# Patient Record
Sex: MALE | Race: WHITE | NOT HISPANIC OR LATINO | Employment: FULL TIME | URBAN - METROPOLITAN AREA
[De-identification: names, ages, dates, MRNs, and addresses within clinical notes are randomized per-mention and may not be internally consistent; named-entity substitution may affect disease eponyms.]

---

## 2013-05-09 LAB — HCV AB SER-ACNC: NEGATIVE

## 2018-06-28 LAB
LEFT EYE DIABETIC RETINOPATHY: NORMAL
RIGHT EYE DIABETIC RETINOPATHY: NORMAL

## 2018-06-28 PROCEDURE — 3072F LOW RISK FOR RETINOPATHY: CPT | Performed by: INTERNAL MEDICINE

## 2018-06-29 PROBLEM — R25.2 MUSCLE CRAMP: Status: ACTIVE | Noted: 2017-12-07

## 2018-06-29 PROBLEM — R79.89 ABNORMAL LIVER FUNCTION TESTS: Status: ACTIVE | Noted: 2018-06-29

## 2018-07-03 ENCOUNTER — LAB (OUTPATIENT)
Dept: LAB | Facility: CLINIC | Age: 38
End: 2018-07-03
Payer: COMMERCIAL

## 2018-07-03 ENCOUNTER — OFFICE VISIT (OUTPATIENT)
Dept: FAMILY MEDICINE CLINIC | Facility: CLINIC | Age: 38
End: 2018-07-03
Payer: COMMERCIAL

## 2018-07-03 ENCOUNTER — TRANSCRIBE ORDERS (OUTPATIENT)
Dept: ADMINISTRATIVE | Facility: HOSPITAL | Age: 38
End: 2018-07-03

## 2018-07-03 ENCOUNTER — TELEPHONE (OUTPATIENT)
Dept: FAMILY MEDICINE CLINIC | Facility: CLINIC | Age: 38
End: 2018-07-03

## 2018-07-03 VITALS
HEART RATE: 77 BPM | OXYGEN SATURATION: 98 % | HEIGHT: 72 IN | BODY MASS INDEX: 34.27 KG/M2 | SYSTOLIC BLOOD PRESSURE: 124 MMHG | DIASTOLIC BLOOD PRESSURE: 74 MMHG | WEIGHT: 253 LBS

## 2018-07-03 DIAGNOSIS — E10.9 TYPE 1 DIABETES MELLITUS WITHOUT COMPLICATION (HCC): ICD-10-CM

## 2018-07-03 DIAGNOSIS — E10.9 TYPE 1 DIABETES MELLITUS WITHOUT COMPLICATION (HCC): Primary | ICD-10-CM

## 2018-07-03 DIAGNOSIS — R79.89 ABNORMAL LIVER FUNCTION TESTS: ICD-10-CM

## 2018-07-03 LAB
ALBUMIN SERPL BCP-MCNC: 4.4 G/DL (ref 3.5–5)
ALP SERPL-CCNC: 49 U/L (ref 46–116)
ALT SERPL W P-5'-P-CCNC: 60 U/L (ref 12–78)
ANION GAP SERPL CALCULATED.3IONS-SCNC: 5 MMOL/L (ref 4–13)
AST SERPL W P-5'-P-CCNC: 56 U/L (ref 5–45)
BILIRUB DIRECT SERPL-MCNC: 0.11 MG/DL (ref 0–0.2)
BILIRUB SERPL-MCNC: 0.61 MG/DL (ref 0.2–1)
BUN SERPL-MCNC: 21 MG/DL (ref 5–25)
CALCIUM SERPL-MCNC: 9.5 MG/DL (ref 8.3–10.1)
CHLORIDE SERPL-SCNC: 106 MMOL/L (ref 100–108)
CO2 SERPL-SCNC: 28 MMOL/L (ref 21–32)
CREAT SERPL-MCNC: 1.32 MG/DL (ref 0.6–1.3)
EST. AVERAGE GLUCOSE BLD GHB EST-MCNC: 148 MG/DL
GFR SERPL CREATININE-BSD FRML MDRD: 69 ML/MIN/1.73SQ M
GLUCOSE SERPL-MCNC: 62 MG/DL (ref 65–140)
HBA1C MFR BLD: 6.8 % (ref 4.2–6.3)
POTASSIUM SERPL-SCNC: 3.9 MMOL/L (ref 3.5–5.3)
PROT SERPL-MCNC: 7.4 G/DL (ref 6.4–8.2)
SODIUM SERPL-SCNC: 139 MMOL/L (ref 136–145)

## 2018-07-03 PROCEDURE — 1036F TOBACCO NON-USER: CPT | Performed by: INTERNAL MEDICINE

## 2018-07-03 PROCEDURE — 80076 HEPATIC FUNCTION PANEL: CPT

## 2018-07-03 PROCEDURE — 80048 BASIC METABOLIC PNL TOTAL CA: CPT

## 2018-07-03 PROCEDURE — 3008F BODY MASS INDEX DOCD: CPT | Performed by: INTERNAL MEDICINE

## 2018-07-03 PROCEDURE — 99203 OFFICE O/P NEW LOW 30 MIN: CPT | Performed by: INTERNAL MEDICINE

## 2018-07-03 PROCEDURE — 83036 HEMOGLOBIN GLYCOSYLATED A1C: CPT

## 2018-07-03 PROCEDURE — 36415 COLL VENOUS BLD VENIPUNCTURE: CPT

## 2018-07-03 NOTE — TELEPHONE ENCOUNTER
Spoke to Caden Engel at [facility]  [employer]  Test strips do not require an auth       Ref #[id number]

## 2018-07-03 NOTE — PROGRESS NOTES
Assessment/Plan:    Type 1 diabetes mellitus without complication (HCC)   Seems to have good control historically though fasting sugars are higher than the rest of the day  We will check Z4Y and metabolic panel today  We did discuss managing his insulin pump when he goes to [facility] and I feel the best approach is for him to reset his pump time to the local time when he lands and then to eat on [facility] mealtimes  Abnormal liver function tests    Check liver function tests  We had previously had him evaluated by GI who felt that his elevated transaminases were related to his large muscle mass  Pure hypercholesterolemia    Will hold off on checking lipid panel today because he is not fasting but check in the future  Muscle cramp    Resolved  Chief complaint: Here to establish care    HPI:    Kavita Garcias is a 30 y o  male with type 1 diabetes mellitus here to establish care  Fasting blood sugars are running between 50- 265 an average of approximately 180  Sugars are better before lunch with most readings in the 100 speed  Before dinner has a few readings in the 2-300 range but also most in the 100s  He had been having muscle cramps in the right pectoral muscle about 6 months ago which has mostly resolved  He was also experiencing insomnia which seems to have resolved after he switched jobs  He now works for an [employer]; only needs to travel to [facility] 3 or 4 times a year       Past Medical History:   Diagnosis Date    Diabetes mellitus ([facility] [address] ) 10/10/2005    without mention of complication, not stated as uncontrolled    Hypercholesterolemia 10/10/2005    Hypokalemia 07/05/2006        Past Surgical History:   Procedure Laterality Date    VARICOCELE EXCISION Left 1995        Family History   Problem Relation Age of Onset    Seizures Sister     Thyroid disease Mother         Social History     Social History    Marital status: /Civil Union     Spouse name: N/A    Number of children: N/A    Years of education: N/A     Occupational History    Not on file  Social History Main Topics    Smoking status: Never Smoker    Smokeless tobacco: Never Used    Alcohol use Yes      Comment: occasionally    Drug use: No    Sexual activity: Yes     Partners: Female     Birth control/ protection: None     Other Topics Concern    Not on file     Social History Narrative    No narrative on file        Current Outpatient Prescriptions   Medication Sig Dispense Refill    glucagon (GLUCAGEN DIAGNOSTIC) 1 mg injection Inject 1 mg into a muscle as needed        HUMALOG 100 UNIT/ML injection as needed MN-0530 1 90 UNITS/-0800 2 40 UNITS/-1200 1 45 UNITS/HR 1200-500PM 1 55 UNITS/-1100 1 50 UNITS/HR 1100-MN 1 55 UNITS/HR       insulin regular subcutaneous syringe 1 unit/mL (finesse) by Other route      glucose blood (CONTOUR NEXT TEST) test strip USE TO TEST BLOOD SUGAR 7-8 TIMES DAILY 100 each 0    Multiple Vitamins-Minerals (MULTIVITAMIN ADULTS PO) Take 1 tablet by mouth daily         No current facility-administered medications for this visit  No Known Allergies    Review of Systems   Constitutional: Negative for fever and unexpected weight change  Eyes: Negative for visual disturbance  Just had his diabetic eye exam    Respiratory: Negative for chest tightness and shortness of breath  Cardiovascular:        Per HPI   Gastrointestinal: Negative for abdominal pain, blood in stool, constipation and diarrhea  Endocrine: Negative for polydipsia and polyuria  Genitourinary: Negative for dysuria and hematuria  Musculoskeletal:        Per HPI       /74 (BP Location: Left arm, Patient Position: Sitting, Cuff Size: Large)   Pulse 77   Ht 5' 11 5" (1 816 m)   Wt 115 kg (253 lb)   SpO2 98%   BMI 34 79 kg/m²     General:  Well-developed, well-nourished, in no acute distress  Skin:  Warm, moist,   Tattoos on back and arm    HENT:  Normocephalic, atraumatic, tympanic membranes clear bilaterally, ear canals unremarkable bilaterally, nasal mucosa without lesions, oropharynx was clear without exudate  Eyes: PERRL, EOMI, conjunctivae normal   Neck:  No thyromegaly, thyroid nodules or cervical lymphadenopathy  Cardiac:  Regular rate and rhythm, no murmur, gallop, or rub  There is no JVD or HJR  Lungs:  Clear to auscultation and percussion  Abdomen:  Soft, nontender, normoactive bowel sounds, no palpable masses, no hepatosplenomegaly  Musculoskeletal:  No clubbing, cyanosis, or edema  He is very muscular  Neurologic:  Cranial nerves 2-12 intact, motor was 5/5 in all major groups, DTRs 2+ throughout  Psychiatric:  Mood bright, appropriate affect and insight

## 2018-07-03 NOTE — ASSESSMENT & PLAN NOTE
Check liver function tests  We had previously had him evaluated by GI who felt that his elevated transaminases were related to his large muscle mass

## 2018-07-03 NOTE — ASSESSMENT & PLAN NOTE
Seems to have good control historically though fasting sugars are higher than the rest of the day  We will check C1P and metabolic panel today  We did discuss managing his insulin pump when he goes to [facility] and I feel the best approach is for him to reset his pump time to the local time when he lands and [facility]

## 2018-07-05 NOTE — PROGRESS NOTES
AST, creatinine are chronically elevated due increased muscle mass  A1c is at goal   Continue present regimen

## 2018-11-19 DIAGNOSIS — E10.9 TYPE 1 DIABETES MELLITUS WITHOUT COMPLICATION (HCC): Primary | ICD-10-CM

## 2018-12-28 LAB
CREAT ?TM UR-SCNC: 196.2 UMOL/L
HBA1C MFR BLD HPLC: 6.8 %
MICROALBUMIN UR-MCNC: 0.7 MG/L (ref 0–20)
MICROALBUMIN/CREAT UR: NORMAL MG/G{CREAT}

## 2018-12-31 ENCOUNTER — OFFICE VISIT (OUTPATIENT)
Dept: FAMILY MEDICINE CLINIC | Facility: CLINIC | Age: 38
End: 2018-12-31
Payer: COMMERCIAL

## 2018-12-31 VITALS
WEIGHT: 252.4 LBS | HEIGHT: 71 IN | SYSTOLIC BLOOD PRESSURE: 122 MMHG | HEART RATE: 73 BPM | OXYGEN SATURATION: 94 % | DIASTOLIC BLOOD PRESSURE: 76 MMHG | BODY MASS INDEX: 35.34 KG/M2

## 2018-12-31 DIAGNOSIS — E10.9 TYPE 1 DIABETES MELLITUS WITHOUT COMPLICATION (HCC): Primary | ICD-10-CM

## 2018-12-31 PROCEDURE — 99214 OFFICE O/P EST MOD 30 MIN: CPT | Performed by: INTERNAL MEDICINE

## 2018-12-31 PROCEDURE — 3008F BODY MASS INDEX DOCD: CPT | Performed by: INTERNAL MEDICINE

## 2018-12-31 RX ORDER — SYRINGE-NEEDLE,INSULIN,0.5 ML 27GX1/2"
SYRINGE, EMPTY DISPOSABLE MISCELLANEOUS 3 TIMES DAILY PRN
Qty: 100 EACH | Refills: 0 | Status: SHIPPED | OUTPATIENT
Start: 2018-12-31

## 2018-12-31 NOTE — ASSESSMENT & PLAN NOTE
Lab Results   Component Value Date    HGBA1C 6 8 12/28/2018    Although there still is some lability to blood sugars, overall control is excellent  Perhaps with if we get a new insulin pump we may be able to graph data directly to the iPhone to get a better sense of patterns  For now, continue the current settings    Get fasting blood work a few days before return visit in 4 months

## 2018-12-31 NOTE — PROGRESS NOTES
Assessment/Plan:    Problem List Items Addressed This Visit        Endocrine    Type 1 diabetes mellitus without complication ([facility] [address] ) - Primary     Lab Results   Component Value Date    HGBA1C 6 8 12/28/2018    Although there still is some lability to blood sugars, overall control is excellent  Perhaps with if we get a new insulin pump we may be able to graph data directly to the iPhone to get a better sense of patterns  For now, continue the current settings  Get fasting blood work a few days before return visit in 4 months             Relevant Medications    HUMALOG 100 UNIT/ML injection    Insulin Syringe 27G X 1/2" 0 5 ML MISC    Other Relevant Orders    Basic metabolic panel    Hemoglobin A1C    Lipid panel         BMI Counseling: Body mass index is 35 2 kg/m²  Discussed the patient's BMI with him  The BMI is in the acceptable range  He is a  and appears to be at about 15% body fat  Subjective:     Patient ID: Violette Briceño is a 41 y o  male  Here for follow up of Type 1 diabetes mellitus without complications  He forgot to bring his blood sugars which have actually been more labile  He had been getting high blood sugars in the afternoon which he attributed to caffeine  He finds that when he puts the insulin pump setup on the buttock, he tends to get more labile readings  He has a very strict regimen that he follows with strict protein/carb/fat ratios for bodybuilding  He tends to get more lows than highs  He sometimes gets reading in 200-300s sometimes and is very aggressive with bolusing insulin  He lifts 7 days a week and is able to find a gym when he travels  Objective:    /76 (BP Location: Left arm, Patient Position: Sitting, Cuff Size: Large)   Pulse 73   Ht 5' 11" (1 803 m)   Wt 114 kg (252 lb 6 4 oz)   SpO2 94%   BMI 35 20 kg/m²       Physical Exam    General:  Well-developed, well-nourished, in no acute distress    Cardiac:  Regular rate and rhythm, no murmur, gallop, or rub  There is no JVD or HJR  Lungs:  Clear to auscultation and percussion  Abdomen:  Soft, nontender, normoactive bowel sounds, no palpable masses, no hepatosplenomegaly  Extremities:  No clubbing, cyanosis, or edema  Patient's shoes and socks removed  Right Foot/Ankle   Right Foot Inspection  Skin Exam: skin normal, skin intact, callus ( right great toe) and callus ( right great toe) no dry skin, no warmth, no erythema, no maceration, no abnormal color, no pre-ulcer and no ulcer                            Sensory       Monofilament testing: intact  Vascular    The right DP pulse is 2+  The right PT pulse is 2+  Left Foot/Ankle  Left Foot Inspection  Skin Exam: skin normal and skin intactno dry skin, no warmth, no erythema, no maceration, normal color, no pre-ulcer, no ulcer and no callus                                         Sensory       Monofilament: intact  Vascular    The left DP pulse is 2+  The left PT pulse is 2+  Assign Risk Category:  No deformity present; No loss of protective sensation;  No weak pulses       Risk: 0

## 2019-02-25 ENCOUNTER — TELEPHONE (OUTPATIENT)
Dept: FAMILY MEDICINE CLINIC | Facility: CLINIC | Age: 39
End: 2019-02-25

## 2019-02-25 NOTE — TELEPHONE ENCOUNTER
I spoke to the pharmacist and clarified that he is indeed receiving the insulin as prescribed via his insulin pump

## 2019-02-25 NOTE — TELEPHONE ENCOUNTER
[facility] is now trying to fill his Humalog  They need clarification on his order  They are questioning the dosing

## 2019-04-11 ENCOUNTER — TELEPHONE (OUTPATIENT)
Dept: FAMILY MEDICINE CLINIC | Facility: CLINIC | Age: 39
End: 2019-04-11

## 2019-04-11 LAB — HBA1C MFR BLD HPLC: 6.3 %

## 2019-04-30 ENCOUNTER — OFFICE VISIT (OUTPATIENT)
Dept: FAMILY MEDICINE CLINIC | Facility: CLINIC | Age: 39
End: 2019-04-30
Payer: COMMERCIAL

## 2019-04-30 VITALS
SYSTOLIC BLOOD PRESSURE: 130 MMHG | DIASTOLIC BLOOD PRESSURE: 74 MMHG | BODY MASS INDEX: 34.1 KG/M2 | HEIGHT: 71 IN | WEIGHT: 243.6 LBS

## 2019-04-30 DIAGNOSIS — E10.9 TYPE 1 DIABETES MELLITUS WITHOUT COMPLICATION (HCC): ICD-10-CM

## 2019-04-30 DIAGNOSIS — E78.49 OTHER HYPERLIPIDEMIA: Primary | ICD-10-CM

## 2019-04-30 PROCEDURE — 1036F TOBACCO NON-USER: CPT | Performed by: INTERNAL MEDICINE

## 2019-04-30 PROCEDURE — 99214 OFFICE O/P EST MOD 30 MIN: CPT | Performed by: INTERNAL MEDICINE

## 2019-04-30 PROCEDURE — 3008F BODY MASS INDEX DOCD: CPT | Performed by: INTERNAL MEDICINE

## 2019-05-21 DIAGNOSIS — E10.9 TYPE 1 DIABETES MELLITUS WITHOUT COMPLICATION (HCC): ICD-10-CM

## 2019-05-24 DIAGNOSIS — E10.9 TYPE 1 DIABETES MELLITUS WITHOUT COMPLICATION (HCC): ICD-10-CM

## 2019-06-03 DIAGNOSIS — E10.9 TYPE 1 DIABETES MELLITUS WITHOUT COMPLICATION (HCC): ICD-10-CM

## 2019-08-27 LAB
LEFT EYE DIABETIC RETINOPATHY: NORMAL
RIGHT EYE DIABETIC RETINOPATHY: NORMAL

## 2019-10-30 LAB
CHOLEST SERPL-MCNC: 176 MG/DL (ref ?–200)
HDLC SERPL-MCNC: 48 MG/DL (ref 40–60)
LDLC SERPL CALC-MCNC: 116 MG/DL (ref 0–100)
TRIGL SERPL-MCNC: 59 MG/DL (ref ?–150)

## 2019-12-05 LAB — HBA1C MFR BLD HPLC: 6.6 %

## 2019-12-09 ENCOUNTER — OFFICE VISIT (OUTPATIENT)
Dept: FAMILY MEDICINE CLINIC | Facility: CLINIC | Age: 39
End: 2019-12-09
Payer: COMMERCIAL

## 2019-12-09 VITALS
OXYGEN SATURATION: 96 % | DIASTOLIC BLOOD PRESSURE: 76 MMHG | HEART RATE: 76 BPM | HEIGHT: 71 IN | WEIGHT: 236.99 LBS | BODY MASS INDEX: 33.18 KG/M2 | SYSTOLIC BLOOD PRESSURE: 120 MMHG

## 2019-12-09 DIAGNOSIS — E10.9 TYPE 1 DIABETES MELLITUS WITHOUT COMPLICATION (HCC): ICD-10-CM

## 2019-12-09 DIAGNOSIS — E78.49 OTHER HYPERLIPIDEMIA: ICD-10-CM

## 2019-12-09 DIAGNOSIS — Z23 NEEDS FLU SHOT: Primary | ICD-10-CM

## 2019-12-09 LAB
CREAT UR-MCNC: 54.3 MG/DL
MICROALBUMIN UR-MCNC: <5 MG/L (ref 0–20)
MICROALBUMIN/CREAT 24H UR: <9 MG/G CREATININE (ref 0–30)

## 2019-12-09 PROCEDURE — 90471 IMMUNIZATION ADMIN: CPT | Performed by: INTERNAL MEDICINE

## 2019-12-09 PROCEDURE — 3008F BODY MASS INDEX DOCD: CPT | Performed by: INTERNAL MEDICINE

## 2019-12-09 PROCEDURE — 82043 UR ALBUMIN QUANTITATIVE: CPT | Performed by: INTERNAL MEDICINE

## 2019-12-09 PROCEDURE — 90686 IIV4 VACC NO PRSV 0.5 ML IM: CPT | Performed by: INTERNAL MEDICINE

## 2019-12-09 PROCEDURE — 82570 ASSAY OF URINE CREATININE: CPT | Performed by: INTERNAL MEDICINE

## 2019-12-09 PROCEDURE — 99213 OFFICE O/P EST LOW 20 MIN: CPT | Performed by: INTERNAL MEDICINE

## 2019-12-09 NOTE — PATIENT INSTRUCTIONS
Please complete our patient survey and let us know about your experience today  Problem List Items Addressed This Visit        Endocrine    Type 1 diabetes mellitus without complication ([facility] [address] )       Lab Results   Component Value Date    HGBA1C 6 6 12/05/2019   Excellent blood sugars on current regimen  Continue current pump settings  Will check urine for microalbumin  Other    Other hyperlipidemia     Acceptable LDL  Continue current diet             Other Visit Diagnoses     Needs flu shot    -  Primary    Type 2 diabetes mellitus without complication, without long-term current use of insulin ([facility] [address] )

## 2019-12-09 NOTE — PROGRESS NOTES
Assessment/Plan:    Problem List Items Addressed This Visit        Endocrine    Type 1 diabetes mellitus without complication ([facility] [address] )       Lab Results   Component Value Date    HGBA1C 6 6 12/05/2019   Excellent blood sugars on current regimen  Continue current pump settings  Will check urine for microalbumin  Relevant Orders    Microalbumin / creatinine urine ratio       Other    Other hyperlipidemia     Acceptable LDL  Continue current diet  Other Visit Diagnoses     Needs flu shot    -  Primary    Relevant Orders    influenza vaccine, 8873-0906, quadrivalent, 0 5 mL, preservative-free, for adult and pediatric patients 6 mos+ (Jacqueline Madrigal, FLULAVAL, FLUZONE)          Chief Complaint     Follow-up          Patient ID: Kourtney Carlin is a 41 y o  male here for routine follow up  He is checking his blood sugars 8-10 per day via finger sticks  There isn't a definite pattern to his blood sugars  He is still weight lifting 7 days a week  His insulin pump settings are: 1 45, 530-0800 2 1, 7911-9683 1 35, 1200-500 1 55, 5-1200 1 5  He is having a low blood sugar every other day which is typical for him  He doesn't have hypoglycemia unawareness  No tingling in feet  Objective:    /76 (BP Location: Left arm, Patient Position: Sitting, Cuff Size: Large)   Pulse 76   Ht 5' 11" (1 803 m)   Wt 107 kg (236 lb 15 9 oz)   SpO2 96%   BMI 33 05 kg/m²     Wt Readings from Last 3 Encounters:   12/09/19 107 kg (236 lb 15 9 oz)   04/30/19 110 kg (243 lb 9 6 oz)   12/31/18 114 kg (252 lb 6 4 oz)         Physical Exam    General:  Well-developed, well-nourished, in no acute distress  Cardiac:  Regular rate and rhythm, no murmur, gallop, or rub  There is no JVD or HJR  Lungs:  Clear to auscultation and percussion  Abdomen:  Soft, nontender, normoactive bowel sounds, no palpable masses, no hepatosplenomegaly  Extremities:  No clubbing, cyanosis, or edema    Diabetic Foot Exam    Patient's shoes and socks removed  Right Foot/Ankle   Right Foot Inspection  Skin Exam: skin normal and skin intact no dry skin, no warmth, no callus, no erythema, no maceration, no abnormal color, no pre-ulcer, no ulcer and no callus                            Sensory       Monofilament testing: intact  Vascular    The right DP pulse is 2+  The right PT pulse is 2+  Left Foot/Ankle  Left Foot Inspection  Skin Exam: skin normal and skin intactno dry skin, no warmth, no erythema, no maceration, normal color, no pre-ulcer, no ulcer and no callus                                         Sensory       Monofilament: intact  Vascular    The left DP pulse is 2+  The left PT pulse is 2+  Assign Risk Category:  No deformity present; No loss of protective sensation;  No weak pulses       Risk: 0

## 2019-12-09 NOTE — ASSESSMENT & PLAN NOTE
Lab Results   Component Value Date    HGBA1C 6 6 12/05/2019   Excellent blood sugars on current regimen  Continue current pump settings  Will check urine for microalbumin

## 2020-02-10 ENCOUNTER — TELEPHONE (OUTPATIENT)
Dept: FAMILY MEDICINE CLINIC | Facility: CLINIC | Age: 40
End: 2020-02-10

## 2020-02-10 NOTE — TELEPHONE ENCOUNTER
Crow's mother called to report that he was acutely ill  He has a fever of 102 8 and had severe rigors  He has had abdominal pain which now has settled in the suprapubic region  He has had no change in his bowel habits, vomiting, dysuria, or hematuria  He has not been taking any new supplements  His blood sugars were sustain in the low 200s earlier despite extra boluses on his insulin pump  His most recent blood sugar was 103  I convinced him to go to urgent care to get chemistries, a CBC, and urinalysis

## 2020-02-11 ENCOUNTER — PATIENT MESSAGE (OUTPATIENT)
Dept: FAMILY MEDICINE CLINIC | Facility: CLINIC | Age: 40
End: 2020-02-11

## 2020-02-28 DIAGNOSIS — E10.9 TYPE 1 DIABETES MELLITUS WITHOUT COMPLICATION (HCC): ICD-10-CM

## 2020-03-05 ENCOUNTER — TELEPHONE (OUTPATIENT)
Dept: FAMILY MEDICINE CLINIC | Facility: CLINIC | Age: 40
End: 2020-03-05

## 2020-03-05 NOTE — TELEPHONE ENCOUNTER
Pharmacy left a message  They need to know the units per day patient will be using with his pump  I can give them a verbal or you can submit a new RX

## 2020-03-05 NOTE — TELEPHONE ENCOUNTER
Leonor Peraza from [employer] called and wanted to know what is the daily amount that Josue should be putting the Humalog into his pump on a daily bases   They stated that this is the only way that the insurance company will cover his insulin

## 2020-03-06 NOTE — TELEPHONE ENCOUNTER
We were waiting for a call back from Tawnya Bull so he could tell us how much insulin he uses daily  Please call him again

## 2020-05-29 DIAGNOSIS — E10.9 TYPE 1 DIABETES MELLITUS WITHOUT COMPLICATION (HCC): ICD-10-CM

## 2020-06-09 ENCOUNTER — TELEPHONE (OUTPATIENT)
Dept: FAMILY MEDICINE CLINIC | Facility: CLINIC | Age: 40
End: 2020-06-09

## 2020-07-20 LAB — HBA1C MFR BLD HPLC: 6.3 %

## 2020-08-13 ENCOUNTER — OFFICE VISIT (OUTPATIENT)
Dept: FAMILY MEDICINE CLINIC | Facility: CLINIC | Age: 40
End: 2020-08-13
Payer: COMMERCIAL

## 2020-08-13 VITALS
OXYGEN SATURATION: 97 % | BODY MASS INDEX: 31.89 KG/M2 | DIASTOLIC BLOOD PRESSURE: 72 MMHG | TEMPERATURE: 98.2 F | HEART RATE: 65 BPM | HEIGHT: 71 IN | WEIGHT: 227.8 LBS | SYSTOLIC BLOOD PRESSURE: 116 MMHG

## 2020-08-13 DIAGNOSIS — E78.49 OTHER HYPERLIPIDEMIA: ICD-10-CM

## 2020-08-13 DIAGNOSIS — E10.9 TYPE 1 DIABETES MELLITUS WITHOUT COMPLICATION (HCC): Primary | ICD-10-CM

## 2020-08-13 PROCEDURE — 3044F HG A1C LEVEL LT 7.0%: CPT | Performed by: INTERNAL MEDICINE

## 2020-08-13 PROCEDURE — 1036F TOBACCO NON-USER: CPT | Performed by: INTERNAL MEDICINE

## 2020-08-13 PROCEDURE — 3725F SCREEN DEPRESSION PERFORMED: CPT | Performed by: INTERNAL MEDICINE

## 2020-08-13 PROCEDURE — 3008F BODY MASS INDEX DOCD: CPT | Performed by: INTERNAL MEDICINE

## 2020-08-13 PROCEDURE — 99213 OFFICE O/P EST LOW 20 MIN: CPT | Performed by: INTERNAL MEDICINE

## 2020-08-13 NOTE — PROGRESS NOTES
Assessment/Plan:    Problem List Items Addressed This Visit        Endocrine    Type 1 diabetes mellitus without complication ([address] ) - Primary       Lab Results   Component Value Date    HGBA1C 6 3 07/20/2020   Excellent glycemic control with his current pump settings  He is managing his pump settings on his own and seems to be making this worked very well for him  Continue current strategy  Relevant Orders    Ambulatory referral to Ophthalmology    Basic metabolic panel    Lipid panel    Hemoglobin A1C       Other    Other hyperlipidemia     His LDL is under 90 on diet alone  Will hold off on starting statins  BMI Counseling: Body mass index is 31 77 kg/m²  Follow-up plan was not completed due to patient refusing BMI follow-up plan  Patient is not obese  He is a  and has about triple normal muscle mass  Chief Complaint     Physical Exam          Patient ID: Jerrell Reina is a 42 y o  male who returns for routine follow up  His morning blood sugars mostly in the 100s as are the remainder of his blood sugars  He may have hypoglycemia 50-65 2-3 times per week  He may test up to 10 times a day  He has been adjusting his basal settings based on increased insulin sensitivity because he lost weight by cutting back his calories  No further abdominal pain after his appendectomy in February  Objective:    /72 (BP Location: Left arm, Patient Position: Sitting, Cuff Size: Large)   Pulse 65   Temp 98 2 °F (36 8 °C)   Ht 5' 11" (1 803 m)   Wt 103 kg (227 lb 12 8 oz)   SpO2 97%   BMI 31 77 kg/m²     Wt Readings from Last 3 Encounters:   08/13/20 103 kg (227 lb 12 8 oz)   12/09/19 107 kg (236 lb 15 9 oz)   04/30/19 110 kg (243 lb 9 6 oz)         Physical Exam    General:  Well-developed, well-nourished, in no acute distress  Cardiac:  Regular rate and rhythm, no murmur, gallop, or rub  There is no JVD or HJR  Lungs:  Clear to auscultation and percussion    Abdomen: Soft, nontender, normoactive bowel sounds, no palpable masses, no hepatosplenomegaly  Extremities:  No clubbing, cyanosis, or edema

## 2020-08-13 NOTE — ASSESSMENT & PLAN NOTE
Lab Results   Component Value Date    HGBA1C 6 3 07/20/2020   Excellent glycemic control with his current pump settings  He is managing his pump settings on his own and seems to be making this worked very well for him  Continue current strategy

## 2020-08-28 LAB
LEFT EYE DIABETIC RETINOPATHY: NORMAL
RIGHT EYE DIABETIC RETINOPATHY: NORMAL

## 2020-09-20 DIAGNOSIS — E10.9 TYPE 1 DIABETES MELLITUS WITHOUT COMPLICATION (HCC): ICD-10-CM

## 2020-09-21 ENCOUNTER — TELEPHONE (OUTPATIENT)
Dept: FAMILY MEDICINE CLINIC | Facility: CLINIC | Age: 40
End: 2020-09-21

## 2020-09-21 DIAGNOSIS — E10.9 TYPE 1 DIABETES MELLITUS WITHOUT COMPLICATION (HCC): ICD-10-CM

## 2020-09-21 RX ORDER — INSULIN LISPRO 100 [IU]/ML
INJECTION, SOLUTION INTRAVENOUS; SUBCUTANEOUS
Qty: 70 ML | Refills: 3 | Status: SHIPPED | OUTPATIENT
Start: 2020-09-21 | End: 2020-09-21 | Stop reason: CLARIF

## 2020-09-21 NOTE — TELEPHONE ENCOUNTER
[facility] pharmacy called stating they received refill request for insulin but it came through as a cartridge  I    The order needs to be sent through as a vial and the order also needs to have the Max units per day listed  They are asking for a new RX to be sent

## 2020-09-26 DIAGNOSIS — E10.9 TYPE 1 DIABETES MELLITUS WITHOUT COMPLICATION (HCC): ICD-10-CM

## 2020-11-27 LAB — HBA1C MFR BLD HPLC: 6.4 %

## 2020-12-03 ENCOUNTER — OFFICE VISIT (OUTPATIENT)
Dept: FAMILY MEDICINE CLINIC | Facility: CLINIC | Age: 40
End: 2020-12-03
Payer: COMMERCIAL

## 2020-12-03 VITALS
HEIGHT: 71 IN | BODY MASS INDEX: 32.98 KG/M2 | SYSTOLIC BLOOD PRESSURE: 122 MMHG | WEIGHT: 235.6 LBS | TEMPERATURE: 98.4 F | DIASTOLIC BLOOD PRESSURE: 72 MMHG | OXYGEN SATURATION: 96 % | HEART RATE: 65 BPM

## 2020-12-03 DIAGNOSIS — E78.49 OTHER HYPERLIPIDEMIA: ICD-10-CM

## 2020-12-03 DIAGNOSIS — Z00.00 ANNUAL PHYSICAL EXAM: Primary | ICD-10-CM

## 2020-12-03 DIAGNOSIS — E10.9 TYPE 1 DIABETES MELLITUS WITHOUT COMPLICATION (HCC): ICD-10-CM

## 2020-12-03 LAB
CREAT UR-MCNC: 57.3 MG/DL
MICROALBUMIN UR-MCNC: <5 MG/L (ref 0–20)
MICROALBUMIN/CREAT 24H UR: <9 MG/G CREATININE (ref 0–30)

## 2020-12-03 PROCEDURE — 3725F SCREEN DEPRESSION PERFORMED: CPT | Performed by: INTERNAL MEDICINE

## 2020-12-03 PROCEDURE — 82043 UR ALBUMIN QUANTITATIVE: CPT | Performed by: INTERNAL MEDICINE

## 2020-12-03 PROCEDURE — 1036F TOBACCO NON-USER: CPT | Performed by: INTERNAL MEDICINE

## 2020-12-03 PROCEDURE — 82570 ASSAY OF URINE CREATININE: CPT | Performed by: INTERNAL MEDICINE

## 2020-12-03 PROCEDURE — 3008F BODY MASS INDEX DOCD: CPT | Performed by: INTERNAL MEDICINE

## 2020-12-03 PROCEDURE — 99395 PREV VISIT EST AGE 18-39: CPT | Performed by: INTERNAL MEDICINE

## 2020-12-03 RX ORDER — MELOXICAM 15 MG/1
15 TABLET ORAL DAILY
COMMUNITY
Start: 2020-11-15 | End: 2021-03-30

## 2021-03-10 DIAGNOSIS — Z23 ENCOUNTER FOR IMMUNIZATION: ICD-10-CM

## 2021-03-17 ENCOUNTER — IMMUNIZATIONS (OUTPATIENT)
Dept: FAMILY MEDICINE CLINIC | Facility: HOSPITAL | Age: 41
End: 2021-03-17

## 2021-03-17 DIAGNOSIS — Z23 ENCOUNTER FOR IMMUNIZATION: Primary | ICD-10-CM

## 2021-03-17 PROCEDURE — 0001A SARS-COV-2 / COVID-19 MRNA VACCINE (PFIZER-BIONTECH) 30 MCG: CPT

## 2021-03-17 PROCEDURE — 91300 SARS-COV-2 / COVID-19 MRNA VACCINE (PFIZER-BIONTECH) 30 MCG: CPT

## 2021-03-19 LAB — HBA1C MFR BLD HPLC: 6 %

## 2021-03-19 PROCEDURE — 3044F HG A1C LEVEL LT 7.0%: CPT | Performed by: INTERNAL MEDICINE

## 2021-03-30 ENCOUNTER — OFFICE VISIT (OUTPATIENT)
Dept: FAMILY MEDICINE CLINIC | Facility: CLINIC | Age: 41
End: 2021-03-30
Payer: COMMERCIAL

## 2021-03-30 VITALS
OXYGEN SATURATION: 98 % | WEIGHT: 229 LBS | SYSTOLIC BLOOD PRESSURE: 120 MMHG | HEIGHT: 71 IN | DIASTOLIC BLOOD PRESSURE: 68 MMHG | HEART RATE: 72 BPM | BODY MASS INDEX: 32.06 KG/M2 | TEMPERATURE: 97.8 F

## 2021-03-30 DIAGNOSIS — E78.49 OTHER HYPERLIPIDEMIA: ICD-10-CM

## 2021-03-30 DIAGNOSIS — Z11.4 SCREENING FOR HIV (HUMAN IMMUNODEFICIENCY VIRUS): ICD-10-CM

## 2021-03-30 DIAGNOSIS — E10.9 TYPE 1 DIABETES MELLITUS WITHOUT COMPLICATION (HCC): Primary | ICD-10-CM

## 2021-03-30 PROCEDURE — 1036F TOBACCO NON-USER: CPT | Performed by: INTERNAL MEDICINE

## 2021-03-30 PROCEDURE — 99214 OFFICE O/P EST MOD 30 MIN: CPT | Performed by: INTERNAL MEDICINE

## 2021-03-30 PROCEDURE — 3008F BODY MASS INDEX DOCD: CPT | Performed by: INTERNAL MEDICINE

## 2021-03-30 NOTE — ASSESSMENT & PLAN NOTE
Lab Results   Component Value Date    HGBA1C 6 0 03/19/2021   Somewhat challenging to manage his hypoglycemic episodes as there does not appear to be a clear pattern  That said, it does seem that some of them are related to his workouts and I suggested maybe he have a rapid acting carbohydrate before works out  He is also going to need to continue to adjust his insulin settings as he loses more weight

## 2021-03-30 NOTE — PROGRESS NOTES
Assessment/Plan:    Problem List Items Addressed This Visit        Endocrine    Type 1 diabetes mellitus without complication ([facility] [address] ) - Primary       Lab Results   Component Value Date    HGBA1C 6 0 03/19/2021   Somewhat challenging to manage his hypoglycemic episodes as there does not appear to be a clear pattern  That said, it does seem that some of them are related to his workouts and I suggested maybe he have a rapid acting carbohydrate before works out  He is also going to need to continue to adjust his insulin settings as he loses more weight  Relevant Orders    Hemoglobin A1C    Lipid panel    Basic metabolic panel      Other Visit Diagnoses     Screening for HIV (human immunodeficiency virus)        Relevant Orders    HIV 1/2 Antigen/Antibody (4th Generation) w Reflex [facility]    Encounter for immunization              Chief Complaint     Follow-up          Patient ID: Ghulam Aguilar is a 47 y o  male who returns for routine follow up  He has been limiting his carbs and has better glycemic control  He is having at least 1 hypoglycemic episode per day  There doesn't seem to be a pattern according to his glucose monitor  He does notice that after his workouts he may get a low even if he takes a snack prior to his workout  His weight is down 6 lbs  He had to change his insulin sensitivity seems to have increased so he has taken to reducing the carbs entered into his monitor      Pump settings:  6432-5775 1 3  530-0830 1 9  830-12 1 35  12-5 1 7  5:00 p m -12:00 a m  1 4    Objective:    /68 (BP Location: Right arm, Patient Position: Sitting, Cuff Size: Large)   Pulse 72   Temp 97 8 °F (36 6 °C)   Ht 5' 11" (1 803 m)   Wt 104 kg (229 lb)   SpO2 98%   BMI 31 94 kg/m²     Wt Readings from Last 3 Encounters:   03/30/21 104 kg (229 lb)   12/03/20 107 kg (235 lb 9 6 oz)   08/13/20 103 kg (227 lb 12 8 oz)         Physical Exam    General:  Well-developed, well-nourished, in no acute distress  Cardiac:  Regular rate and rhythm, no murmur, gallop, or rub  There is no JVD or HJR  Lungs:  Clear to auscultation and percussion  Abdomen:  Soft, nontender, normoactive bowel sounds, no palpable masses, no hepatosplenomegaly  Extremities:  No clubbing, cyanosis, or edema

## 2021-04-14 ENCOUNTER — IMMUNIZATIONS (OUTPATIENT)
Dept: FAMILY MEDICINE CLINIC | Facility: HOSPITAL | Age: 41
End: 2021-04-14

## 2021-04-14 DIAGNOSIS — Z23 ENCOUNTER FOR IMMUNIZATION: Primary | ICD-10-CM

## 2021-04-14 PROCEDURE — 91300 SARS-COV-2 / COVID-19 MRNA VACCINE (PFIZER-BIONTECH) 30 MCG: CPT

## 2021-04-14 PROCEDURE — 0002A SARS-COV-2 / COVID-19 MRNA VACCINE (PFIZER-BIONTECH) 30 MCG: CPT

## 2021-05-26 ENCOUNTER — TELEPHONE (OUTPATIENT)
Dept: FAMILY MEDICINE CLINIC | Facility: CLINIC | Age: 41
End: 2021-05-26

## 2021-05-26 NOTE — TELEPHONE ENCOUNTER
Patients wife called and stated that patient got a US done at [facility]  They told them that the US was normal and nothing was wrong  Patients wife would like to know if you could look over the results and see if you get anything from it  Patient is still in a lot of pain       Results are in [place]

## 2021-05-26 NOTE — TELEPHONE ENCOUNTER
I did review the results but did we order the [address]? It doesn't look like we did - can we find out who ordered it? GI etc?  Thanks

## 2021-05-26 NOTE — TELEPHONE ENCOUNTER
The US does show thickening of the gall bladder wall, with some lesions, and some sludge in his gall bladder  This may cause pain, but not generally severe pain  If it is severe he may need to go to the ED    I can also try and order a CT scan of the abdomen as his US was abnormal

## 2021-05-26 NOTE — TELEPHONE ENCOUNTER
Spoke to patients wife, she stated that she is going to take patient to the ED as he is in severe pain   [place] so she stated she was going to take him to [facility]

## 2021-05-26 NOTE — TELEPHONE ENCOUNTER
[facility]  They ordered the US and sent him [facility] to get it done, Patient states was told nothing was wrong on the [address] but called and is having severe stomach pain  Patient's wife wanted a second opinion with the US

## 2021-05-30 DIAGNOSIS — E10.9 TYPE 1 DIABETES MELLITUS WITHOUT COMPLICATION (HCC): ICD-10-CM

## 2021-06-01 ENCOUNTER — TELEPHONE (OUTPATIENT)
Dept: FAMILY MEDICINE CLINIC | Facility: CLINIC | Age: 41
End: 2021-06-01

## 2021-06-01 RX ORDER — BLOOD SUGAR DIAGNOSTIC
STRIP MISCELLANEOUS
Qty: 900 STRIP | Refills: 3 | Status: SHIPPED | OUTPATIENT
Start: 2021-06-01 | End: 2022-05-31 | Stop reason: SDUPTHER

## 2021-06-01 NOTE — TELEPHONE ENCOUNTER
I spoke to  Josue this afternoon  He was admitted to 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

## 2021-06-01 NOTE — TELEPHONE ENCOUNTER
Patient had emergency surgery 5/26 at [facility] and wanted to touch base with you on his blood sugars  In the mornings they are over 200 and they stay there all day until the evening  He has been limiting his carb intake and stated that even taking his insulin it is not making much of a difference  They drop to 115-140 by the end of the day  Would like to know what he can do about this      Please advise

## 2021-06-02 ENCOUNTER — TELEPHONE (OUTPATIENT)
Dept: FAMILY MEDICINE CLINIC | Facility: CLINIC | Age: 41
End: 2021-06-02

## 2021-06-03 NOTE — TELEPHONE ENCOUNTER
I sent him a [employer] message with instructions 
Patient called with his blood sugars for today  Last evening at 6:28pm= 157  6/1 @ 7:36pm= 175  6/1 @ 11:25pm= 172  6/2 @ 6:22am= 153  6/2 @ 8:47am= 188  6/2 @ 10:01am= 188  6/2 @ 11:33am= 260  6/2 @ 12:24pm= 269  6/2 @ 2:32pm= 222  6/2 @ 3:30pm= 216  6/2 @ 4:30pm= 149         States he eats breakfast around 8am and normally gives himself 33 carbohydrates in his pump, today he gave his pump 50 carbohydrates and sugars still increased  Please advise 
I, Wiliam Olivo, performed the initial face to face bedside interview with this patient regarding history of present illness, review of symptoms and relevant past medical, social and family history.  I completed an independent physical examination.  I was the initial provider who evaluated this patient. I have signed out the follow up of any pending tests (i.e. labs, radiological studies) to the ACP.  I have communicated the patient’s plan of care and disposition with the ACP.

## 2021-06-07 ENCOUNTER — PATIENT MESSAGE (OUTPATIENT)
Dept: FAMILY MEDICINE CLINIC | Facility: CLINIC | Age: 41
End: 2021-06-07

## 2021-06-07 DIAGNOSIS — D64.9 ANEMIA FOLLOWING SURGERY: Primary | ICD-10-CM

## 2021-06-21 NOTE — TELEPHONE ENCOUNTER
I spoke with Charlene Yepez  He is scheduled to see Dr Nely Mauro tomorrow to discuss blood in stool (mixed in, not on the surface) and persistent drenching night sweats (changing bedding 3 times a night)  I suggested to Charlene Yepez that he likely needs a follow up CT abdomen to rule out abscess and a colonoscopy to ascertain source of bleeding  I gave him my cell so he can let me know what the evaluation plan is

## 2021-07-02 LAB — EXTERNAL HIV SCREEN: NORMAL

## 2021-07-06 ENCOUNTER — OFFICE VISIT (OUTPATIENT)
Dept: FAMILY MEDICINE CLINIC | Facility: CLINIC | Age: 41
End: 2021-07-06
Payer: COMMERCIAL

## 2021-07-06 VITALS
HEART RATE: 84 BPM | WEIGHT: 210 LBS | BODY MASS INDEX: 29.4 KG/M2 | DIASTOLIC BLOOD PRESSURE: 64 MMHG | TEMPERATURE: 98 F | OXYGEN SATURATION: 98 % | HEIGHT: 71 IN | SYSTOLIC BLOOD PRESSURE: 122 MMHG

## 2021-07-06 DIAGNOSIS — S30.1XXA ABDOMINAL HEMATOMA: ICD-10-CM

## 2021-07-06 DIAGNOSIS — D64.9 POSTOPERATIVE ANEMIA: ICD-10-CM

## 2021-07-06 DIAGNOSIS — E10.9 TYPE 1 DIABETES MELLITUS WITHOUT COMPLICATION (HCC): Primary | ICD-10-CM

## 2021-07-06 DIAGNOSIS — R35.1 NOCTURIA: ICD-10-CM

## 2021-07-06 PROBLEM — L80 VITILIGO: Status: ACTIVE | Noted: 2021-07-06

## 2021-07-06 PROCEDURE — 1036F TOBACCO NON-USER: CPT | Performed by: INTERNAL MEDICINE

## 2021-07-06 PROCEDURE — 99214 OFFICE O/P EST MOD 30 MIN: CPT | Performed by: INTERNAL MEDICINE

## 2021-07-06 PROCEDURE — 3725F SCREEN DEPRESSION PERFORMED: CPT | Performed by: INTERNAL MEDICINE

## 2021-07-06 PROCEDURE — 3008F BODY MASS INDEX DOCD: CPT | Performed by: INTERNAL MEDICINE

## 2021-07-06 NOTE — ASSESSMENT & PLAN NOTE
Lab Results   Component Value Date    HGBA1C 6 0 03/19/2021   Diabetes control is excellent   Continue pump settings and modification

## 2021-07-06 NOTE — PATIENT INSTRUCTIONS
Abdominal hematoma  Will just monitor symptoms for now  Hold off on repeating CT abdomen as it wouldn't  for now  Type 1 diabetes mellitus without complication [facility])    Lab Results   Component Value Date    HGBA1C 6 0 03/19/2021   Diabetes control is excellent   Continue pump settings and modification

## 2021-07-06 NOTE — ASSESSMENT & PLAN NOTE
Skin changes are consistent with vitiligo which is certainly something seen with type 1 diabetes  Recommend observation and sunscreen

## 2021-07-06 NOTE — PROGRESS NOTES
Assessment/Plan:    Problem List Items Addressed This Visit        Endocrine    Type 1 diabetes mellitus without complication ([facility] [address] ) - Primary       Lab Results   Component Value Date    HGBA1C 6 0 03/19/2021   Diabetes control is excellent  Continue pump settings and modification         Relevant Orders    Basic metabolic panel    Hemoglobin A1C    Lipid panel       Other    Abdominal hematoma     Will just monitor symptoms for now  Hold off on repeating CT abdomen as it wouldn't  for now  Other Visit Diagnoses     Nocturia        Relevant Orders    Ambulatory referral to Urology    Postoperative anemia        Relevant Orders    CBC      BMI Counseling: Body mass index is 29 29 kg/m²  The BMI is above normal  Nutrition recommendations include moderation in carbohydrate intake  Exercise recommendations include strength training exercises  Chief Complaint     Follow-up; Care Gap Request          Patient ID: Morgan Hook is a 47 y o  male who returns for routine follow up  He had a colonoscopy 10 days ago which revealed bleeding from the anastomosis  He hasn't had any further rectal bleeding and he is having normal bowel movements  His night sweats stopped about a week ago  He reports that he has been getting up to urinate 5 times at night and finds it is more difficult to empty his bladder when he stands up as opposed to urinating when he is sitting  He does feel that he is emptying his bladder  His blood sugars have been well-controlled and he has been reducing his basal rates on his insulin pump  His weight dropped to 198 lbs but he has been supplementing his protein and walking 35 minutes per day  He knows that he has been getting light patches on his skin involving his cheeks and forehead  This was more evident when he got a tan      Objective:    /64 (BP Location: Right arm, Patient Position: Sitting, Cuff Size: Large)   Pulse 84   Temp 98 °F (36 7 °C)   Ht 5' 11" (1 803 m)   Wt 95 3 kg (210 lb)   SpO2 98%   BMI 29 29 kg/m²     Wt Readings from Last 3 Encounters:   07/06/21 95 3 kg (210 lb)   03/30/21 104 kg (229 lb)   12/03/20 107 kg (235 lb 9 6 oz)         Physical Exam    General:  Well-developed, well-nourished, in no acute distress  Cardiac:  Regular rate and rhythm, no murmur, gallop, or rub  There is no JVD or HJR  Lungs:  Clear to auscultation and percussion  Abdomen:  Soft, nontender, normoactive bowel sounds, no palpable masses, no hepatosplenomegaly  Well-healed laparotomy scar  Mild right lower quadrant tenderness but no palpable mass  Extremities:  No clubbing, cyanosis, or edema  Skin:  Hypopigmentation of the skin on the forehead and also on the malar region

## 2021-09-08 DIAGNOSIS — E10.9 TYPE 1 DIABETES MELLITUS WITHOUT COMPLICATION (HCC): ICD-10-CM

## 2021-09-14 ENCOUNTER — TELEPHONE (OUTPATIENT)
Dept: FAMILY MEDICINE CLINIC | Facility: CLINIC | Age: 41
End: 2021-09-14

## 2021-09-14 NOTE — TELEPHONE ENCOUNTER
Can you resend a prescription for patient's insulin   Pharmacy needs a description of usage and how much and how often he should be taking it

## 2021-09-15 ENCOUNTER — PATIENT MESSAGE (OUTPATIENT)
Dept: FAMILY MEDICINE CLINIC | Facility: CLINIC | Age: 41
End: 2021-09-15

## 2021-09-15 DIAGNOSIS — E10.9 TYPE 1 DIABETES MELLITUS WITHOUT COMPLICATION (HCC): ICD-10-CM

## 2021-10-18 DIAGNOSIS — E10.9 TYPE 1 DIABETES MELLITUS WITHOUT COMPLICATION (HCC): ICD-10-CM

## 2021-11-23 ENCOUNTER — PATIENT MESSAGE (OUTPATIENT)
Dept: FAMILY MEDICINE CLINIC | Facility: CLINIC | Age: 41
End: 2021-11-23

## 2021-11-23 ENCOUNTER — OFFICE VISIT (OUTPATIENT)
Dept: FAMILY MEDICINE CLINIC | Facility: CLINIC | Age: 41
End: 2021-11-23
Payer: COMMERCIAL

## 2021-11-23 VITALS
HEART RATE: 85 BPM | SYSTOLIC BLOOD PRESSURE: 122 MMHG | BODY MASS INDEX: 35 KG/M2 | DIASTOLIC BLOOD PRESSURE: 78 MMHG | WEIGHT: 250 LBS | HEIGHT: 71 IN | TEMPERATURE: 98.2 F | OXYGEN SATURATION: 98 %

## 2021-11-23 DIAGNOSIS — E10.9 TYPE 1 DIABETES MELLITUS WITHOUT COMPLICATION (HCC): Primary | ICD-10-CM

## 2021-11-23 DIAGNOSIS — E78.49 OTHER HYPERLIPIDEMIA: ICD-10-CM

## 2021-11-23 DIAGNOSIS — G56.03 BILATERAL CARPAL TUNNEL SYNDROME: ICD-10-CM

## 2021-11-23 DIAGNOSIS — G56.03 BILATERAL CARPAL TUNNEL SYNDROME: Primary | ICD-10-CM

## 2021-11-23 DIAGNOSIS — K42.9 UMBILICAL HERNIA WITHOUT OBSTRUCTION AND WITHOUT GANGRENE: ICD-10-CM

## 2021-11-23 PROCEDURE — 3008F BODY MASS INDEX DOCD: CPT | Performed by: INTERNAL MEDICINE

## 2021-11-23 PROCEDURE — 99214 OFFICE O/P EST MOD 30 MIN: CPT | Performed by: INTERNAL MEDICINE

## 2021-11-23 PROCEDURE — 1036F TOBACCO NON-USER: CPT | Performed by: INTERNAL MEDICINE

## 2021-12-17 LAB
CREAT ?TM UR-SCNC: 0.7 UMOL/L
MICROALBUMIN/CREAT UR: 165.5 MG/G{CREAT}

## 2021-12-17 PROCEDURE — 3060F POS MICROALBUMINURIA REV: CPT | Performed by: INTERNAL MEDICINE

## 2021-12-28 ENCOUNTER — TELEPHONE (OUTPATIENT)
Dept: ADMINISTRATIVE | Facility: OTHER | Age: 41
End: 2021-12-28

## 2022-02-22 ENCOUNTER — TELEPHONE (OUTPATIENT)
Dept: ADMINISTRATIVE | Facility: OTHER | Age: 42
End: 2022-02-22

## 2022-02-22 ENCOUNTER — OFFICE VISIT (OUTPATIENT)
Dept: FAMILY MEDICINE CLINIC | Facility: CLINIC | Age: 42
End: 2022-02-22
Payer: COMMERCIAL

## 2022-02-22 VITALS
DIASTOLIC BLOOD PRESSURE: 76 MMHG | BODY MASS INDEX: 34.72 KG/M2 | HEART RATE: 91 BPM | SYSTOLIC BLOOD PRESSURE: 122 MMHG | HEIGHT: 71 IN | TEMPERATURE: 98.2 F | OXYGEN SATURATION: 99 % | WEIGHT: 248 LBS

## 2022-02-22 DIAGNOSIS — K42.9 UMBILICAL HERNIA WITHOUT OBSTRUCTION AND WITHOUT GANGRENE: ICD-10-CM

## 2022-02-22 DIAGNOSIS — E78.49 OTHER HYPERLIPIDEMIA: ICD-10-CM

## 2022-02-22 DIAGNOSIS — G56.03 BILATERAL CARPAL TUNNEL SYNDROME: ICD-10-CM

## 2022-02-22 DIAGNOSIS — Z11.59 NEED FOR HEPATITIS C SCREENING TEST: ICD-10-CM

## 2022-02-22 DIAGNOSIS — Z11.4 SCREENING FOR HIV (HUMAN IMMUNODEFICIENCY VIRUS): ICD-10-CM

## 2022-02-22 DIAGNOSIS — E10.9 TYPE 1 DIABETES MELLITUS WITHOUT COMPLICATION (HCC): Primary | ICD-10-CM

## 2022-02-22 PROBLEM — S30.1XXA ABDOMINAL HEMATOMA: Status: RESOLVED | Noted: 2021-07-06 | Resolved: 2022-02-22

## 2022-02-22 PROCEDURE — 3008F BODY MASS INDEX DOCD: CPT | Performed by: INTERNAL MEDICINE

## 2022-02-22 PROCEDURE — 99395 PREV VISIT EST AGE 18-39: CPT | Performed by: INTERNAL MEDICINE

## 2022-02-22 PROCEDURE — 3725F SCREEN DEPRESSION PERFORMED: CPT | Performed by: INTERNAL MEDICINE

## 2022-02-22 PROCEDURE — 1036F TOBACCO NON-USER: CPT | Performed by: INTERNAL MEDICINE

## 2022-02-22 NOTE — ASSESSMENT & PLAN NOTE
Did not respond to carpal tunnel injections  He is likely going to end up getting carpal tunnel release with Orthopedics

## 2022-02-22 NOTE — TELEPHONE ENCOUNTER
----- Message from Ruby Wise sent at 2/22/2022  9:20 AM EST -----  02/22/22 9:20 AM    Hello, our patient Sharan Nolan has had HIV completed/performed  Please assist in updating the patient chart by pulling the Care Everywhere (CE) document  The date of service is 7/2/2021       Thank you,  Ruby Wise  [facility] PRIMARY CARE

## 2022-02-22 NOTE — ASSESSMENT & PLAN NOTE
Lab Results   Component Value Date    HGBA1C 6 2 02/21/2022   Excellent diabetes control on his current pump settings  He manages his settings independently

## 2022-02-22 NOTE — PROGRESS NOTES
Assessment/Plan:    Type 1 diabetes mellitus without complication (HCC)    Lab Results   Component Value Date    HGBA1C 6 2 02/21/2022   Excellent diabetes control on his current pump settings  He manages his settings independently  Bilateral carpal tunnel syndrome  Did not respond to carpal tunnel injections  He is likely going to end up getting carpal tunnel release with Orthopedics  Other hyperlipidemia  Excellent lipid panel without statins  Umbilical hernia without obstruction and without gangrene  Unchanged  Would recommend observation for now  BMI Counseling: Body mass index is 34 59 kg/m²  The BMI is above normal  Exercise recommendations include strength training exercises  Rationale for BMI follow-up plan is due to patient being overweight or obese  BMI is not an accurate      Chief Complaint     Physical Exam          Patient ID: Murray Soto is a 46 y o  male who returns for routine follow up  He feels that his insulin sensitivity is lower than it had been before his surgery  He is trying to limit his carbs  He remains on the insulin pump but he is up to 47 units a day  Not having rare low readings  He had injections in his carpal tunnels which didn't help  His orthopedist wants him to have surgery  Objective:    /76 (BP Location: Right arm, Patient Position: Sitting)   Pulse 91   Temp 98 2 °F (36 8 °C)   Ht 5' 11" (1 803 m)   Wt 112 kg (248 lb)   SpO2 99%   BMI 34 59 kg/m²     Wt Readings from Last 3 Encounters:   02/22/22 112 kg (248 lb)   11/23/21 113 kg (250 lb)   07/06/21 95 3 kg (210 lb)         Physical Exam    General:  Well-developed, well-nourished, in no acute distress  Markedly muscular  Cardiac:  Regular rate and rhythm, no murmur, gallop, or rub  There is no JVD or HJR  Lungs:  Clear to auscultation and percussion  Abdomen:  Soft, nontender, normoactive bowel sounds, no palpable masses, no hepatosplenomegaly  Small reducible umbilical hernia  Extremities:  No clubbing, cyanosis, or edema

## 2022-02-22 NOTE — TELEPHONE ENCOUNTER
Upon review of the In Basket request we were able to locate, review, and update the patient chart as requested for Hepatitis C  and HIV  Any additional questions or concerns should be emailed to the Practice Liaisons via [email or web address] email, please do not reply via In Basket      Thank you  Karis Castillo

## 2022-02-22 NOTE — TELEPHONE ENCOUNTER
----- Message from Khurram Darnell sent at 2/22/2022  9:21 AM EST -----  02/22/22 9:21 AM    Hello, our patient Eboni Graff has had Hepatitis C completed/performed  Please assist in updating the patient chart by pulling the Care Everywhere (CE) document  The date of service is 5/9/2013       Thank you,  Khurram Darnell  [facility] PRIMARY [facility]

## 2022-05-13 ENCOUNTER — PATIENT MESSAGE (OUTPATIENT)
Dept: FAMILY MEDICINE CLINIC | Facility: CLINIC | Age: 42
End: 2022-05-13

## 2022-05-13 NOTE — LETTER
Date: 5/18/2022    To whom it may concern:    Carlos Gibson is my patient  He has a medical condition that makes it difficult to serve on a jury  Please excuse from jury duty      Sincerely,        Macy Banda MD

## 2022-05-16 LAB — HBA1C MFR BLD HPLC: 5.6 %

## 2022-05-16 PROCEDURE — 3044F HG A1C LEVEL LT 7.0%: CPT | Performed by: INTERNAL MEDICINE

## 2022-05-31 ENCOUNTER — OFFICE VISIT (OUTPATIENT)
Dept: FAMILY MEDICINE CLINIC | Facility: CLINIC | Age: 42
End: 2022-05-31
Payer: COMMERCIAL

## 2022-05-31 VITALS
HEART RATE: 85 BPM | DIASTOLIC BLOOD PRESSURE: 68 MMHG | BODY MASS INDEX: 33.46 KG/M2 | WEIGHT: 239 LBS | SYSTOLIC BLOOD PRESSURE: 122 MMHG | HEIGHT: 71 IN | OXYGEN SATURATION: 99 % | TEMPERATURE: 98 F

## 2022-05-31 DIAGNOSIS — Z23 ENCOUNTER FOR IMMUNIZATION: ICD-10-CM

## 2022-05-31 DIAGNOSIS — K42.9 UMBILICAL HERNIA WITHOUT OBSTRUCTION AND WITHOUT GANGRENE: ICD-10-CM

## 2022-05-31 DIAGNOSIS — E10.9 TYPE 1 DIABETES MELLITUS WITHOUT COMPLICATION (HCC): Primary | ICD-10-CM

## 2022-05-31 DIAGNOSIS — G56.03 BILATERAL CARPAL TUNNEL SYNDROME: ICD-10-CM

## 2022-05-31 PROCEDURE — 99214 OFFICE O/P EST MOD 30 MIN: CPT | Performed by: INTERNAL MEDICINE

## 2022-05-31 PROCEDURE — 3008F BODY MASS INDEX DOCD: CPT | Performed by: INTERNAL MEDICINE

## 2022-05-31 PROCEDURE — 1036F TOBACCO NON-USER: CPT | Performed by: INTERNAL MEDICINE

## 2022-05-31 RX ORDER — BLOOD SUGAR DIAGNOSTIC
STRIP MISCELLANEOUS
Qty: 900 STRIP | Refills: 3 | Status: SHIPPED | OUTPATIENT
Start: 2022-05-31

## 2022-05-31 NOTE — ASSESSMENT & PLAN NOTE
Lab Results   Component Value Date    HGBA1C 5 6 05/16/2022   Out standing glycemic control though he is having more hypoglycemic events  Agree with him dying down his insulin pump settings  We discussed that we certainly want to avoid nocturnal hypoglycemic events in particular  Continue current diet and exercise regimen

## 2022-05-31 NOTE — PROGRESS NOTES
Assessment/Plan:    Type 1 diabetes mellitus without complication (HCC)    Lab Results   Component Value Date    HGBA1C 5 6 05/16/2022   Out standing glycemic control though he is having more hypoglycemic events  Agree with him dying down his insulin pump settings  We discussed that we certainly want to avoid nocturnal hypoglycemic events in particular  Continue current diet and exercise regimen  Umbilical hernia without obstruction and without gangrene  The umbilical hernia is unchanged  We both agreed we just observe this for now  Bilateral carpal tunnel syndrome  He is going to have the right carpal tunnel repaired 1st and then will have left one done  Chief Complaint     Follow-up          Patient ID: Kourtney Carlin is a 46 y o  male who returns for routine follow up  He has had more lows recently and he has been dialing down his insulin pump settings  He has dropped 9 lbs mostly in body fat  He is on a very low carb diet recently  He takes in about 200 grams of protein per day  He goes to the gym 7 days a week most weeks  He is going to have his right carpal tunnel repair soon  Objective:    /68 (BP Location: Left arm, Patient Position: Sitting, Cuff Size: Large)   Pulse 85   Temp 98 °F (36 7 °C)   Ht 5' 11" (1 803 m)   Wt 108 kg (239 lb)   SpO2 99%   BMI 33 33 kg/m²     Wt Readings from Last 3 Encounters:   05/31/22 108 kg (239 lb)   02/22/22 112 kg (248 lb)   11/23/21 113 kg (250 lb)         Physical Exam    General:  Well-developed, well-nourished, in no acute distress  Cardiac:  Regular rate and rhythm, no murmur, gallop, or rub  There is no JVD or HJR  Lungs:  Clear to auscultation and percussion  Abdomen:  Soft, nontender, normoactive bowel sounds, no palpable masses, no hepatosplenomegaly  Small, reducible umbilical hernia  Extremities:  No clubbing, cyanosis, or edema

## 2022-06-03 DIAGNOSIS — E10.9 TYPE 1 DIABETES MELLITUS WITHOUT COMPLICATION (HCC): ICD-10-CM

## 2022-06-06 RX ORDER — INSULIN LISPRO 100 [IU]/ML
INJECTION, SOLUTION INTRAVENOUS; SUBCUTANEOUS
Qty: 70 ML | Refills: 1 | Status: SHIPPED | OUTPATIENT
Start: 2022-06-06

## 2022-06-30 LAB
LEFT EYE DIABETIC RETINOPATHY: NORMAL
RIGHT EYE DIABETIC RETINOPATHY: NORMAL
SEVERITY (EYE EXAM): NORMAL

## 2022-09-20 ENCOUNTER — VBI (OUTPATIENT)
Dept: ADMINISTRATIVE | Facility: OTHER | Age: 42
End: 2022-09-20

## 2022-09-20 NOTE — TELEPHONE ENCOUNTER
Upon review of the In Basket request and the patient's chart, initial outreach has been made via fax, please see Contacts section for details       Thank you  Rhett Trimble MA

## 2022-09-20 NOTE — LETTER
Diabetic Eye Exam Form    Date Requested: 22  Patient: Jerrell Reina  Patient : 1980   Referring Provider: Shira Yoon MD    DIABETIC Eye Exam Date _______________________________    Type of Exam MUST be documented for Diabetic Eye Exams  Please CHECK ONE  Retinal Exam       Dilated Retinal Exam       OCT       Optomap-Iris Exam      Fundus Photography     Left Eye - Please check Retinopathy AND Type or No Retinopathy      Exam did show retinopathy    Exam did not show retinopathy         Mild     Proliferative           Moderate    Severe            None         Right Eye - Please check Retinopathy AND Type or No Retinopathy     Exam did show retinopathy    Exam did not show retinopathy         Mild     Proliferative        Moderate    Severe        None       Comments __________________________________________________________    Practice Providing Exam ______________________________________________    Exam Performed By (print name) _______________________________________      Provider Signature ___________________________________________________    These reports are needed for  compliance  Please fax this completed form and a copy of the Diabetic Eye Exam report to our office located at [place] [address] as soon as possible via [phone] attention Betsy: Phone [phone]  We thank you for your assistance in treating our mutual patient

## 2022-09-20 NOTE — LETTER
Diabetic Eye Exam Form    Date Requested: 22  Patient: Jason Severino  Patient : 1980   Referring Provider: Blake Mariee MD    DIABETIC Eye Exam Date _______________________________    Type of Exam MUST be documented for Diabetic Eye Exams  Please CHECK ONE  Retinal Exam       Dilated Retinal Exam       OCT       Optomap-Iris Exam      Fundus Photography     Left Eye - Please check Retinopathy AND Type or No Retinopathy      Exam did show retinopathy    Exam did not show retinopathy         Mild     Proliferative           Moderate    Severe            None         Right Eye - Please check Retinopathy AND Type or No Retinopathy     Exam did show retinopathy    Exam did not show retinopathy         Mild     Proliferative        Moderate    Severe        None       Comments __________________________________________________________    Practice Providing Exam ______________________________________________    Exam Performed By (print name) _______________________________________      Provider Signature ___________________________________________________    These reports are needed for  compliance  Please fax this completed form and a copy of the Diabetic Eye Exam report to our office located at [place] [address] as soon as possible via [phone] attention Betsy: Phone [phone]  We thank you for your assistance in treating our mutual patient

## 2022-09-26 NOTE — TELEPHONE ENCOUNTER
As a follow-up, a second attempt has been made for outreach via fax, please see Contacts section for details      Thank you  Patricia Mary MA

## 2022-09-29 NOTE — TELEPHONE ENCOUNTER
As a final attempt, a third outreach has been made via telephone call  Please see Contacts section for details  This encounter will be closed and completed by end of day  Should we receive the requested information because of previous outreach attempts, the requested patient's chart will be updated appropriately       Thank you  Joaquin Joel

## 2022-10-03 ENCOUNTER — VBI (OUTPATIENT)
Dept: ADMINISTRATIVE | Facility: OTHER | Age: 42
End: 2022-10-03

## 2022-10-03 NOTE — TELEPHONE ENCOUNTER
Upon review of the In Basket request we were able to locate, review, and update the patient chart as requested for Diabetic Eye Exam     Report came in via fax     Any additional questions or concerns should be emailed to the Practice Liaisons via [email or web address] email, please do not reply via In Basket      Thank you  Kami Monroy

## 2022-10-14 ENCOUNTER — TELEPHONE (OUTPATIENT)
Dept: FAMILY MEDICINE CLINIC | Facility: CLINIC | Age: 42
End: 2022-10-14

## 2022-10-14 LAB — HBA1C MFR BLD HPLC: 5.9 %

## 2022-10-14 PROCEDURE — 3044F HG A1C LEVEL LT 7.0%: CPT | Performed by: INTERNAL MEDICINE

## 2022-10-18 ENCOUNTER — OFFICE VISIT (OUTPATIENT)
Dept: FAMILY MEDICINE CLINIC | Facility: CLINIC | Age: 42
End: 2022-10-18
Payer: COMMERCIAL

## 2022-10-18 VITALS
DIASTOLIC BLOOD PRESSURE: 82 MMHG | HEIGHT: 71 IN | SYSTOLIC BLOOD PRESSURE: 128 MMHG | BODY MASS INDEX: 35.06 KG/M2 | HEART RATE: 78 BPM | WEIGHT: 250.4 LBS | OXYGEN SATURATION: 96 % | TEMPERATURE: 97.8 F

## 2022-10-18 DIAGNOSIS — Z23 NEEDS FLU SHOT: ICD-10-CM

## 2022-10-18 DIAGNOSIS — E10.9 TYPE 1 DIABETES MELLITUS WITHOUT COMPLICATION (HCC): Primary | ICD-10-CM

## 2022-10-18 DIAGNOSIS — K42.9 UMBILICAL HERNIA WITHOUT OBSTRUCTION AND WITHOUT GANGRENE: ICD-10-CM

## 2022-10-18 LAB
CREAT UR-MCNC: 48.1 MG/DL
MICROALBUMIN UR-MCNC: <5 MG/L (ref 0–20)
MICROALBUMIN/CREAT 24H UR: <10 MG/G CREATININE (ref 0–30)

## 2022-10-18 PROCEDURE — 90471 IMMUNIZATION ADMIN: CPT | Performed by: INTERNAL MEDICINE

## 2022-10-18 PROCEDURE — 90686 IIV4 VACC NO PRSV 0.5 ML IM: CPT | Performed by: INTERNAL MEDICINE

## 2022-10-18 PROCEDURE — 82043 UR ALBUMIN QUANTITATIVE: CPT | Performed by: INTERNAL MEDICINE

## 2022-10-18 PROCEDURE — 82570 ASSAY OF URINE CREATININE: CPT | Performed by: INTERNAL MEDICINE

## 2022-10-18 PROCEDURE — 99214 OFFICE O/P EST MOD 30 MIN: CPT | Performed by: INTERNAL MEDICINE

## 2022-10-18 RX ORDER — MELOXICAM 15 MG/1
15 TABLET ORAL AS NEEDED
COMMUNITY
Start: 2022-09-26

## 2022-10-18 NOTE — ASSESSMENT & PLAN NOTE
Lab Results   Component Value Date    HGBA1C 5 9 10/14/2022   Out standing glycemic control on his current pump settings  We discussed the possibility of getting a CGM  He had one of the early CGM switch did not work well for him as blood kept leaking into the tubing of the monitor  We can discuss further at the next visit

## 2022-10-18 NOTE — PROGRESS NOTES
Assessment/Plan:    Type 1 diabetes mellitus without complication (HCC)    Lab Results   Component Value Date    HGBA1C 5 9 10/14/2022   Out standing glycemic control on his current pump settings  We discussed the possibility of getting a CGM  He had one of the early CGM switch did not work well for him as blood kept leaking into the tubing of the monitor  We can discuss further at the next visit  Umbilical hernia without obstruction and without gangrene  He is asymptomatic and it is reducible  He is not considering surgery at this time  BMI Counseling: Body mass index is 34 92 kg/m²  The BMI is above normal  Exercise recommendations include strength training exercises  Rationale for BMI follow-up plan is due to patient being overweight or obese  He is not obese  He is a   Depression Screening and Follow-up Plan: Patient was screened for depression during today's encounter  They screened negative with a PHQ-2 score of 0  Chief Complaint     Follow-up; Care Gap Request; Flu Vaccine          Patient ID: Vick Milian is a 46 y o  male who returns for routine follow up  His blood sugars are running  in the mornings, middday around 100, before supper 130-160  His lows are rare and random and will typically occur midmorning after his workout meal  He still feels low sugars <70 and he can feel if his blood sugar goes over 190  He lifts 6 days a week most weeks  He just saw the podiatrist for Achilles tendonitis  He is scheduled for a follow up colonoscopy with Dr Sonja Alexander        Objective:    /82 (BP Location: Right arm, Patient Position: Sitting, Cuff Size: Large)   Pulse 78   Temp 97 8 °F (36 6 °C)   Ht 5' 11" (1 803 m)   Wt 114 kg (250 lb 6 4 oz)   SpO2 96%   BMI 34 92 kg/m²     Wt Readings from Last 3 Encounters:   10/18/22 114 kg (250 lb 6 4 oz)   05/31/22 108 kg (239 lb)   02/22/22 112 kg (248 lb)         Physical Exam    General:  Well-developed, well-nourished, in no acute distress  Cardiac:  Regular rate and rhythm, no murmur, gallop, or rub  There is no JVD or HJR  Lungs:  Clear to auscultation and percussion  Abdomen:  Soft, nontender, normoactive bowel sounds, reducible umbilical hernia, no hepatosplenomegaly  Extremities:  No clubbing, cyanosis, or edema

## 2023-01-14 LAB — HBA1C MFR BLD HPLC: 5.7 %

## 2023-01-14 PROCEDURE — 3044F HG A1C LEVEL LT 7.0%: CPT | Performed by: INTERNAL MEDICINE

## 2023-01-17 ENCOUNTER — OFFICE VISIT (OUTPATIENT)
Dept: FAMILY MEDICINE CLINIC | Facility: CLINIC | Age: 43
End: 2023-01-17

## 2023-01-17 VITALS
WEIGHT: 253 LBS | TEMPERATURE: 98 F | HEART RATE: 81 BPM | OXYGEN SATURATION: 95 % | SYSTOLIC BLOOD PRESSURE: 138 MMHG | HEIGHT: 71 IN | BODY MASS INDEX: 35.42 KG/M2 | DIASTOLIC BLOOD PRESSURE: 72 MMHG

## 2023-01-17 DIAGNOSIS — E10.9 TYPE 1 DIABETES MELLITUS WITHOUT COMPLICATION (HCC): Primary | ICD-10-CM

## 2023-01-17 RX ORDER — BLOOD-GLUCOSE TRANSMITTER
EACH MISCELLANEOUS
COMMUNITY
Start: 2022-11-10

## 2023-01-17 RX ORDER — BLOOD-GLUCOSE SENSOR
EACH MISCELLANEOUS
COMMUNITY
Start: 2023-01-02

## 2023-01-17 RX ORDER — BLOOD-GLUCOSE,RECEIVER,CONT
EACH MISCELLANEOUS
COMMUNITY
Start: 2022-11-08

## 2023-01-17 NOTE — PROGRESS NOTES
Assessment/Plan:    Type 1 diabetes mellitus without complication (HCC)    Lab Results   Component Value Date    HGBA1C 5 7 01/14/2023   Excellent glycemic control now with a combination of the CGM and his insulin pump  He seems to have better control over his highs and lows  Continue current pump settings  I advised him to use pumice on his calluses  Chief Complaint    Follow-up; Care Gap Request         Patient ID: Vin Anderson is a 38 y o  male who returns for routine follow up  He has started using the Dexcom CGM  He has been able to avoid checking his blood sugar through finger sticks so many time per day  He has been able to get in front of rising blood sugars  It has also helped reduce the frequency of his lows  Objective:    /72 (BP Location: Left arm, Patient Position: Sitting, Cuff Size: Large)   Pulse 81   Temp 98 °F (36 7 °C)   Ht 5' 11" (1 803 m)   Wt 115 kg (253 lb)   SpO2 95%   BMI 35 29 kg/m²     Wt Readings from Last 3 Encounters:   01/17/23 115 kg (253 lb)   10/18/22 114 kg (250 lb 6 4 oz)   05/31/22 108 kg (239 lb)         Physical Exam    General:  Well-developed, well-nourished, in no acute distress  Very muscular  Cardiac:  Regular rate and rhythm, no murmur, gallop, or rub  There is no JVD or HJR  Lungs:  Clear to auscultation and percussion  Abdomen:  Soft, nontender, normoactive bowel sounds, no palpable masses, no hepatosplenomegaly  Extremities:  No clubbing, cyanosis, or edema  Diabetic Foot Exam    Patient's shoes and socks removed  Right Foot/Ankle   Right Foot Inspection  Skin Exam: skin normal, skin intact, callus and callus  No dry skin, no warmth, no erythema, no maceration, no abnormal color, no pre-ulcer and no ulcer  Sensory   Monofilament testing: intact    Vascular  The right DP pulse is 2+  The right PT pulse is 2+  Left Foot/Ankle  Left Foot Inspection  Skin Exam: skin normal, skin intact and callus   No dry skin, no warmth, no erythema, no maceration, normal color, no pre-ulcer and no ulcer  Sensory   Monofilament testing: intact    Vascular  The left DP pulse is 2+  The left PT pulse is 2+       Assign Risk Category  No deformity present  No loss of protective sensation  No weak pulses  Risk: 0

## 2023-01-17 NOTE — ASSESSMENT & PLAN NOTE
Lab Results   Component Value Date    HGBA1C 5 7 01/14/2023   Excellent glycemic control now with a combination of the CGM and his insulin pump  He seems to have better control over his highs and lows  Continue current pump settings

## 2023-03-23 NOTE — PATIENT INSTRUCTIONS
Check blood work today  [facility]  When you land in [facility], reset your pump time to local time and adjust sugars with boluses as needed 
denies pain/discomfort (Rating = 0)

## 2023-04-29 LAB — HBA1C MFR BLD HPLC: 5.3 %

## 2023-05-03 DIAGNOSIS — E10.9 TYPE 1 DIABETES MELLITUS WITHOUT COMPLICATION (HCC): ICD-10-CM

## 2023-05-03 RX ORDER — INSULIN LISPRO 100 [IU]/ML
INJECTION, SOLUTION INTRAVENOUS; SUBCUTANEOUS
Qty: 70 ML | Refills: 1 | Status: SHIPPED | OUTPATIENT
Start: 2023-05-03

## 2023-05-15 LAB
LEFT EYE DIABETIC RETINOPATHY: POSITIVE
RIGHT EYE DIABETIC RETINOPATHY: POSITIVE

## 2023-05-15 PROCEDURE — 2022F DILAT RTA XM EVC RTNOPTHY: CPT | Performed by: INTERNAL MEDICINE

## 2023-05-16 ENCOUNTER — OFFICE VISIT (OUTPATIENT)
Dept: FAMILY MEDICINE CLINIC | Facility: CLINIC | Age: 43
End: 2023-05-16

## 2023-05-16 VITALS
HEIGHT: 71 IN | DIASTOLIC BLOOD PRESSURE: 67 MMHG | HEART RATE: 81 BPM | WEIGHT: 240.2 LBS | SYSTOLIC BLOOD PRESSURE: 133 MMHG | BODY MASS INDEX: 33.63 KG/M2 | OXYGEN SATURATION: 95 %

## 2023-05-16 DIAGNOSIS — E10.9 TYPE 1 DIABETES MELLITUS WITHOUT COMPLICATION (HCC): Primary | ICD-10-CM

## 2023-05-16 DIAGNOSIS — K42.9 UMBILICAL HERNIA WITHOUT OBSTRUCTION AND WITHOUT GANGRENE: ICD-10-CM

## 2023-05-16 LAB
CREAT UR-MCNC: 45.3 MG/DL
MICROALBUMIN UR-MCNC: <5 MG/L (ref 0–20)
MICROALBUMIN/CREAT 24H UR: <11 MG/G CREATININE (ref 0–30)

## 2023-05-16 RX ORDER — INSULIN LISPRO 100 [IU]/ML
INJECTION, SOLUTION INTRAVENOUS; SUBCUTANEOUS
Qty: 70 ML | Refills: 1 | COMMUNITY
Start: 2023-05-16

## 2023-05-16 NOTE — PROGRESS NOTES
"Assessment/Plan:    Problem List Items Addressed This Visit        Endocrine    Type 1 diabetes mellitus without complication ([facility] [address] ) - Primary       Lab Results   Component Value Date    HGBA1C 5 3 04/29/2023   Outstanding glycemic control  Continue current insulin pump settings and CGM monitoring  Relevant Medications    HumaLOG 100 UNIT/ML injection    Other Relevant Orders    Albumin / creatinine urine ratio    Basic metabolic panel    Hemoglobin A1C       Other    Umbilical hernia without obstruction and without gangrene     I suggested that if he does want to have his hernia repair that he should look into a center of excellence given the fact that he is a  and would put a lot of stress on his abdominal wall postoperatively  Did discuss the [facility] in [facility]) but that may not be an option for him  Chief Complaint    Physical Exam; Care Gap Request         Patient ID: Jackie Paula is a 38 y o  male who returns for routine follow up  His CGM readings are mostly in the low 100s  He has ctu back his intake and has noticed an increase in insulin sensitivity  He still has about one hypoglycemic episode a day  He still is aware of his low blood sugars  He seems to feel worse if his blood sugars go above 160  He feels that his umbilical hernia is getting a little bit larger and he may look into getting it repaired in the near future  Objective:    /67 (BP Location: Left arm, Patient Position: Sitting, Cuff Size: Large)   Pulse 81   Ht 5' 11\" (1 803 m)   Wt 109 kg (240 lb 3 2 oz)   SpO2 95%   BMI 33 50 kg/m²     Wt Readings from Last 3 Encounters:   05/16/23 109 kg (240 lb 3 2 oz)   01/17/23 115 kg (253 lb)   10/18/22 114 kg (250 lb 6 4 oz)         Physical Exam    General:  Well-developed, well-nourished, in no acute distress  Cardiac:  Regular rate and rhythm, no murmur, gallop, or rub  There is no JVD or HJR    Lungs:  Clear to auscultation and " percussion  Abdomen:  Soft, nontender, normoactive bowel sounds, no palpable masses, no hepatosplenomegaly  There is a small reducible umbilical hernia and is also a small ventral hernia superior to the umbilicus which is evident on Valsalva  Extremities:  No clubbing, cyanosis, or edema

## 2023-05-16 NOTE — ASSESSMENT & PLAN NOTE
I suggested that if he does want to have his hernia repair that he should look into a center of excellence given the fact that he is a  and would put a lot of stress on his abdominal wall postoperatively  Did discuss the [facility] in [facility]) but that may not be an option for him

## 2023-05-16 NOTE — ASSESSMENT & PLAN NOTE
Lab Results   Component Value Date    HGBA1C 5 3 04/29/2023   Outstanding glycemic control  Continue current insulin pump settings and CGM monitoring

## 2023-05-30 DIAGNOSIS — E10.9 TYPE 1 DIABETES MELLITUS WITHOUT COMPLICATION (HCC): ICD-10-CM

## 2023-05-30 RX ORDER — PERPHENAZINE 16 MG/1
TABLET, FILM COATED ORAL
Qty: 900 STRIP | Refills: 3 | Status: SHIPPED | OUTPATIENT
Start: 2023-05-30 | End: 2023-06-06 | Stop reason: SDUPTHER

## 2023-05-30 RX ORDER — PROCHLORPERAZINE 25 MG/1
SUPPOSITORY RECTAL
Qty: 1 EACH | Refills: 3 | Status: SHIPPED | OUTPATIENT
Start: 2023-05-30 | End: 2023-06-06 | Stop reason: SDUPTHER

## 2023-05-31 DIAGNOSIS — E10.9 TYPE 1 DIABETES MELLITUS WITHOUT COMPLICATION (HCC): ICD-10-CM

## 2023-05-31 RX ORDER — INSULIN LISPRO 100 [IU]/ML
INJECTION, SOLUTION INTRAVENOUS; SUBCUTANEOUS
Qty: 70 ML | Refills: 3 | Status: SHIPPED | OUTPATIENT
Start: 2023-05-31

## 2023-06-06 DIAGNOSIS — E10.9 TYPE 1 DIABETES MELLITUS WITHOUT COMPLICATION (HCC): ICD-10-CM

## 2023-06-06 RX ORDER — PERPHENAZINE 16 MG/1
TABLET, FILM COATED ORAL
Qty: 900 STRIP | Refills: 3 | Status: SHIPPED | OUTPATIENT
Start: 2023-06-06 | End: 2023-06-08 | Stop reason: SDUPTHER

## 2023-06-06 RX ORDER — PROCHLORPERAZINE 25 MG/1
SUPPOSITORY RECTAL
Qty: 1 EACH | Refills: 3 | Status: SHIPPED | OUTPATIENT
Start: 2023-06-06

## 2023-06-08 DIAGNOSIS — E10.9 TYPE 1 DIABETES MELLITUS WITHOUT COMPLICATION (HCC): ICD-10-CM

## 2023-06-08 RX ORDER — PROCHLORPERAZINE 25 MG/1
SUPPOSITORY RECTAL
Qty: 1 EACH | Refills: 3 | OUTPATIENT
Start: 2023-06-08

## 2023-06-08 RX ORDER — PERPHENAZINE 16 MG/1
TABLET, FILM COATED ORAL
Qty: 900 STRIP | Refills: 3 | Status: SHIPPED | OUTPATIENT
Start: 2023-06-08

## 2023-09-02 ENCOUNTER — TELEPHONE (OUTPATIENT)
Dept: OTHER | Facility: OTHER | Age: 43
End: 2023-09-02

## 2023-09-02 LAB
EXTERNAL CREATININE: 1.35
EXTERNAL EGFR: 58.53
HBA1C MFR BLD HPLC: 5.6 %

## 2023-09-02 NOTE — TELEPHONE ENCOUNTER
Lab Result: Glucose is 48   Date/Time Drawn: 09/2/23 / 6 am   Ordering Provider: Haley Beauhcamp   Lab Personnel's Name: [facility]       The following critical/stat result was read back to the lab as stated above and [employer] to the on-call provider. The provider confirmed receipt of the message.

## 2023-09-15 DIAGNOSIS — E10.9 TYPE 1 DIABETES MELLITUS WITHOUT COMPLICATION (HCC): Primary | ICD-10-CM

## 2023-09-15 RX ORDER — PROCHLORPERAZINE 25 MG/1
SUPPOSITORY RECTAL
Qty: 1 EACH | Refills: 0 | Status: SHIPPED | OUTPATIENT
Start: 2023-09-15

## 2023-09-19 ENCOUNTER — OFFICE VISIT (OUTPATIENT)
Dept: FAMILY MEDICINE CLINIC | Facility: CLINIC | Age: 43
End: 2023-09-19
Payer: COMMERCIAL

## 2023-09-19 VITALS
HEART RATE: 85 BPM | BODY MASS INDEX: 32.56 KG/M2 | SYSTOLIC BLOOD PRESSURE: 118 MMHG | OXYGEN SATURATION: 97 % | TEMPERATURE: 98 F | HEIGHT: 71 IN | DIASTOLIC BLOOD PRESSURE: 66 MMHG | WEIGHT: 232.6 LBS

## 2023-09-19 DIAGNOSIS — K42.9 UMBILICAL HERNIA WITHOUT OBSTRUCTION AND WITHOUT GANGRENE: ICD-10-CM

## 2023-09-19 DIAGNOSIS — E10.9 TYPE 1 DIABETES MELLITUS WITHOUT COMPLICATION (HCC): Primary | ICD-10-CM

## 2023-09-19 PROCEDURE — 99214 OFFICE O/P EST MOD 30 MIN: CPT | Performed by: INTERNAL MEDICINE

## 2023-09-19 PROCEDURE — 99396 PREV VISIT EST AGE 40-64: CPT | Performed by: INTERNAL MEDICINE

## 2023-09-19 NOTE — ASSESSMENT & PLAN NOTE
He has both an umbilical hernia and also an incisional hernia superior to the umbilicus. He is going to be seeing a surgeon at [facility] in [place].

## 2023-09-19 NOTE — PROGRESS NOTES
Assessment/Plan:    Problem List Items Addressed This Visit        Endocrine    Type 1 diabetes mellitus without complication ([address]) - Primary       Lab Results   Component Value Date    HGBA1C 5.6 09/02/2023   He has excellent glycemic control and is managing his blood sugars aggressively with his insulin pump. Low he has frequent hypoglycemic episodes, he does not have hypoglycemic unawareness. Continue current regimen. Relevant Orders    Basic metabolic panel    Hemoglobin A1C       Other    Umbilical hernia without obstruction and without gangrene     He has both an umbilical hernia and also an incisional hernia superior to the umbilicus. He is going to be seeing a surgeon at [facility] in [place]. Chief Complaint    Physical Exam; Care Gap Request         Patient ID: Jose Maria Holliday is a 38 y.o. male who returns for a preventive visit. His weight was down to 250 through diet and weight training. He's back up to 232 lbs. His blood sugars are mostly in the low 100s. He is having daily hypoglycemic either before or after his workout. He still has warning symptoms. He is very aggressive with his management of his sugars. He tends to feel "crappy" if he goes over 170 so he tends to dose aggressively to keep from getting there. He had some problems with his CGM transmitter and had to replace. Objective:    /66 (BP Location: Right arm)   Pulse 85   Temp 98 °F (36.7 °C)   Ht 5' 11" (1.803 m)   Wt 106 kg (232 lb 9.6 oz)   SpO2 97%   BMI 32.44 kg/m²     Wt Readings from Last 3 Encounters:   09/19/23 106 kg (232 lb 9.6 oz)   05/16/23 109 kg (240 lb 3.2 oz)   01/17/23 115 kg (253 lb)         Physical Exam  Vitals reviewed. Constitutional:       General: He is not in acute distress. Appearance: Normal appearance. He is well-developed. He is not ill-appearing. Comments: He has impressive upper and lower body muscle bulk.    HENT:      Head: Normocephalic and atraumatic. Right Ear: Tympanic membrane and external ear normal.      Left Ear: Tympanic membrane and external ear normal.      Nose: Nose normal.      Mouth/Throat:      Mouth: Mucous membranes are moist.      Pharynx: Oropharynx is clear. Eyes:      General: No scleral icterus. Neck:      Thyroid: No thyromegaly. Vascular: No JVD. Cardiovascular:      Rate and Rhythm: Normal rate and regular rhythm. Heart sounds: Normal heart sounds. No murmur heard. No friction rub. No gallop. Pulmonary:      Breath sounds: Normal breath sounds. Abdominal:      General: Bowel sounds are normal. There is no distension. Palpations: Abdomen is soft. There is no mass. Comments: Reducible umbilical hernia. He does have an incisional hernia that is more obvious on Valsalva just superior to the umbilicus in the region of his laparotomy scar. Musculoskeletal:         General: No swelling. Skin:     Comments: Tattoo on right arm and upper back. No significant skin lesions. Neurological:      Mental Status: He is alert.    Psychiatric:         Mood and Affect: Mood normal.

## 2023-09-19 NOTE — ASSESSMENT & PLAN NOTE
Lab Results   Component Value Date    HGBA1C 5.6 09/02/2023   He has excellent glycemic control and is managing his blood sugars aggressively with his insulin pump. Low he has frequent hypoglycemic episodes, he does not have hypoglycemic unawareness. Continue current regimen.

## 2023-10-12 DIAGNOSIS — E10.9 TYPE 1 DIABETES MELLITUS WITHOUT COMPLICATION (HCC): Primary | ICD-10-CM

## 2023-10-12 RX ORDER — PROCHLORPERAZINE 25 MG/1
SUPPOSITORY RECTAL
Qty: 3 EACH | Refills: 5 | Status: SHIPPED | OUTPATIENT
Start: 2023-10-12

## 2023-10-17 ENCOUNTER — TELEPHONE (OUTPATIENT)
Dept: ADMINISTRATIVE | Facility: OTHER | Age: 43
End: 2023-10-17

## 2023-10-17 NOTE — TELEPHONE ENCOUNTER
----- Message from Chandra Bauer sent at 10/16/2023  5:09 PM EDT -----  Regarding: Care Gap request  10/16/23 5:09 PM    Hello, our patient attached above has had Glomerular Filtration Rate (GFR) completed/performed. Please assist in updating the patient chart by pulling the Care Everywhere (CE) document. The date of service is 9/2023.      Thank you,  [facility]

## 2023-10-18 NOTE — TELEPHONE ENCOUNTER
Upon review of the In Basket request we were able to locate, review, and update the patient chart as requested for eGFR. Any additional questions or concerns should be emailed to the Practice Liaisons via the appropriate education email address, please do not reply via In Basket.     Thank you  Tim Gracia MA

## 2023-11-29 DIAGNOSIS — E10.9 TYPE 1 DIABETES MELLITUS WITHOUT COMPLICATION (HCC): ICD-10-CM

## 2023-11-30 RX ORDER — PERPHENAZINE 16 MG/1
TABLET, FILM COATED ORAL
Qty: 900 STRIP | Refills: 3 | Status: SHIPPED | OUTPATIENT
Start: 2023-11-30

## 2024-02-10 LAB — HBA1C MFR BLD HPLC: 5.2 %

## 2024-04-02 ENCOUNTER — OFFICE VISIT (OUTPATIENT)
Dept: FAMILY MEDICINE CLINIC | Facility: CLINIC | Age: 44
End: 2024-04-02
Payer: COMMERCIAL

## 2024-04-02 VITALS
HEIGHT: 71 IN | BODY MASS INDEX: 36.55 KG/M2 | SYSTOLIC BLOOD PRESSURE: 128 MMHG | HEART RATE: 86 BPM | OXYGEN SATURATION: 97 % | WEIGHT: 261.1 LBS | DIASTOLIC BLOOD PRESSURE: 82 MMHG | TEMPERATURE: 98 F

## 2024-04-02 DIAGNOSIS — K42.9 UMBILICAL HERNIA WITHOUT OBSTRUCTION AND WITHOUT GANGRENE: ICD-10-CM

## 2024-04-02 DIAGNOSIS — E10.9 TYPE 1 DIABETES MELLITUS WITHOUT COMPLICATION (HCC): Primary | ICD-10-CM

## 2024-04-02 LAB
CREAT UR-MCNC: 142.7 MG/DL
MICROALBUMIN UR-MCNC: <7 MG/L
MICROALBUMIN/CREAT 24H UR: <5 MG/G CREATININE (ref 0–30)

## 2024-04-02 PROCEDURE — 82570 ASSAY OF URINE CREATININE: CPT | Performed by: INTERNAL MEDICINE

## 2024-04-02 PROCEDURE — 99214 OFFICE O/P EST MOD 30 MIN: CPT | Performed by: INTERNAL MEDICINE

## 2024-04-02 PROCEDURE — 82043 UR ALBUMIN QUANTITATIVE: CPT | Performed by: INTERNAL MEDICINE

## 2024-04-02 NOTE — PROGRESS NOTES
Name: Crow Corea      : 1980      MRN: [id number]  Encounter Provider: Levar Watosn MD  Encounter Date: 2024   Encounter department: [facility] PRIMARY CARE    Assessment & Plan     Assessment & Plan  1. Type 1 diabetes.  His A1c is 5.2% the patient's diabetes is currently well-managed, as evidenced by an improved overall control of his blood sugar levels.  He will continue the insulin pump and Dexcom and make adjustments on his own as he has been doing for quite some time.  A foot examination was conducted today, revealing calluses on the soles of his feet.    2. Umbilical hernia.  The hernia is reducible.  He will follow-up with surgery at [place] for robotic repair.    Follow-up  The patient is scheduled for a follow-up visit in 4 months, at which time blood work will be conducted.         Subjective      History of Present Illness  The patient is a 43-year-old male who presents for evaluation of multiple medical concerns.    The patient's blood glucose levels have been well-managed, with an A1c level of 5.2. He utilizes a Dexcom device for continuous glucose monitoring, which he monitors continuously. Despite occasional calibration of the device, he occasionally experiences low readings. His baseline glucose levels range from 70 to 100. However, last night, his glucose level was 180, prompting him to administer insulin and upon awakening, his glucose level was 115. He reports feeling low prior to the Dexcom alarm indicating low glucose levels. He has intentionally gained weight this winter and is interested in assessing his insulin sensitivity levels. His glucose levels were consistently around 74 to 75 yesterday.    The patient has an incisional hernia located near the umbilicus. He is scheduled to consult with a surgeon at [place] in 2024 and plans to schedule a robotic surgery later this year. The surgeon has reassured him that the hernia is not of concern and that he  "can resume his gym activities without restrictions. He occasionally experiences mild pain and has been informed that the hernia is typically due to fat accumulation.        Objective     /82 (BP Location: Right arm, Patient Position: Sitting, Cuff Size: Extra-Large)   Pulse 86   Temp 98 °F (36.7 °C)   Ht 5' 11\" (1.803 m)   Wt 118 kg (261 lb 1.6 oz)   SpO2 97%   BMI 36.42 kg/m²     Physical Exam    Physical Exam  Cardiovascular:      Pulses: no weak pulses.           Dorsalis pedis pulses are 2+ on the right side and 2+ on the left side.        Posterior tibial pulses are 2+ on the right side and 2+ on the left side.   Feet:      Right foot:      Skin integrity: Callus present. No ulcer, skin breakdown, erythema, warmth or dry skin.      Left foot:      Skin integrity: Callus present. No ulcer, skin breakdown, erythema, warmth or dry skin.         Diabetic Foot Exam    Patient's shoes and socks removed.    Right Foot/Ankle   Right Foot Inspection  Skin Exam: skin normal, skin intact, callus and callus. No dry skin, no warmth, no erythema, no maceration, no abnormal color, no pre-ulcer and no ulcer.     Sensory   Monofilament testing: intact    Vascular  The right DP pulse is 2+. The right PT pulse is 2+.     Left Foot/Ankle  Left Foot Inspection  Skin Exam: skin normal, skin intact and callus. No dry skin, no warmth, no erythema, no maceration, normal color, no pre-ulcer and no ulcer.     Sensory   Monofilament testing: intact    Vascular  The left DP pulse is 2+. The left PT pulse is 2+.     Assign Risk Category  No deformity present  No loss of protective sensation  No weak pulses  Risk: 0    "

## 2024-04-12 ENCOUNTER — TELEPHONE (OUTPATIENT)
Dept: ADMINISTRATIVE | Facility: OTHER | Age: 44
End: 2024-04-12

## 2024-04-12 NOTE — TELEPHONE ENCOUNTER
Upon review of the In Basket request we were able to locate, review, and update the patient chart as requested for Diabetic Eye Exam.    Any additional questions or concerns should be emailed to the Practice Liaisons via the appropriate education email address, please do not reply via In Basket.    Thank you  Joya Herrera

## 2024-04-12 NOTE — TELEPHONE ENCOUNTER
----- Message from Ema Chanel MA sent at 4/12/2024  8:58 AM EDT -----  Regarding: Care Gap request  04/12/24 8:58 AM    Hello, our patient attached above has had Diabetic Eye Exam completed/performed. Please assist in updating the patient chart by pulling the document from the Media Tab. The date of service is 11/16/2023.     Thank you,  Ema Chanel  [facility] PRIMARY [facility]

## 2024-05-16 DIAGNOSIS — E10.9 TYPE 1 DIABETES MELLITUS WITHOUT COMPLICATION (HCC): ICD-10-CM

## 2024-05-17 RX ORDER — PROCHLORPERAZINE 25 MG/1
SUPPOSITORY RECTAL
Qty: 1 EACH | Refills: 0 | Status: SHIPPED | OUTPATIENT
Start: 2024-05-17

## 2024-07-23 DIAGNOSIS — E10.9 TYPE 1 DIABETES MELLITUS WITHOUT COMPLICATION (HCC): ICD-10-CM

## 2024-07-23 RX ORDER — PROCHLORPERAZINE 25 MG/1
SUPPOSITORY RECTAL
Qty: 1 EACH | Refills: 1 | Status: SHIPPED | OUTPATIENT
Start: 2024-07-23

## 2024-07-23 RX ORDER — INSULIN LISPRO 100 [IU]/ML
INJECTION, SOLUTION INTRAVENOUS; SUBCUTANEOUS
Qty: 70 ML | Refills: 1 | Status: SHIPPED | OUTPATIENT
Start: 2024-07-23

## 2024-08-25 ENCOUNTER — PATIENT MESSAGE (OUTPATIENT)
Dept: FAMILY MEDICINE CLINIC | Facility: CLINIC | Age: 44
End: 2024-08-25

## 2024-08-25 DIAGNOSIS — E10.9 TYPE 1 DIABETES MELLITUS WITHOUT COMPLICATION (HCC): Primary | ICD-10-CM

## 2024-08-27 ENCOUNTER — TELEPHONE (OUTPATIENT)
Dept: FAMILY MEDICINE CLINIC | Facility: CLINIC | Age: 44
End: 2024-08-27

## 2024-09-03 ENCOUNTER — CLINICAL SUPPORT (OUTPATIENT)
Dept: FAMILY MEDICINE CLINIC | Facility: CLINIC | Age: 44
End: 2024-09-03

## 2024-09-03 DIAGNOSIS — E10.9 TYPE 1 DIABETES MELLITUS WITHOUT COMPLICATION (HCC): ICD-10-CM

## 2024-09-03 RX ORDER — ACYCLOVIR 400 MG/1
1 TABLET ORAL
Qty: 9 EACH | Refills: 3 | Status: SHIPPED | OUTPATIENT
Start: 2024-09-03

## 2024-09-03 NOTE — PROGRESS NOTES
[facility]  Michelle Damon, Pharmacist    Crow Corea is a 43 y.o. male who was referred to the pharmacist for CGM education and management, referred by Levar Watson MD .      Telemedicine consent  The patient was identified by name and date of birth. Crow Corea was informed that this is a telemedicine visit and that the visit is being conducted through the Epic Embedded platform. He agrees to proceed..  My office door was closed. No one else was in the room.  He acknowledged consent and understanding of privacy and security of the video platform. The patient has agreed to participate and understands they can discontinue the visit at any time.    Assessment/ Plan       Type 1 Diabetes:  Goal A1c <6.5% based on individualized based on age, duration, co-morbidities, and microvascular complications. Most recent A1c   Lab Results   Component Value Date    HGBA1C 5.2 02/10/2024      Currently has Dexcom G6 and having issues with device. Discussed options of Dexcom G7 and Kerrie 3 CGM device. Patient determined that Dexcom G7 would be a better option due to the shorter warm up time, ability to calibrate, and predictive low alarms that Kerrie 3 devices do not have. He is aware to download the Dexcom G7 application on phone. He does not want a separate . He will reach out at any time if he would like to try kerrie device instead.     Changes to Medication Regimen:   If PCP is in agreement with plan  Rx sent for Dexcom G7 sensors per CPA agreement.       Subjective     Home monitoring device (CGM)  Current device: Dexcom G6  Has had device for several years now. Has not tried any of the newer CGM versions.   Uses phone as . Has Iphone.    Current problems with Dexcom G6 device: takes off after 2-3 days due to getting inaccurate readings; will get sensor errors, readings that are off and won't let calibrate, can't apply to abdomen (bleeding) so will apply to  thigh/ arm (readings will elevate then crash down to normal). Patient reports he has little body fat (~10% body fat or less) so has a fard time finding a location with enough body fat to apply sensor.   Insulin pump: yes- but does not integrate with pump       Objective     Lab Results   Component Value Date    HGBA1C 5.2 02/10/2024    HGBA1C 5.2 02/10/2024    HGBA1C 5.6 09/02/2023         Pharmacist Tracking Tool     Pharmacist Tracking Tool  Reason For Outreach: Embedded Pharmacist  Demographics:  Intervention Method: Phone  Type of Intervention: New  Topics Addressed: Diabetes  Pharmacologic Interventions: Medication Initiation  Non-Pharmacologic Interventions: Personal CGM  Time:  Direct Patient Care:  30  mins  Care Coordination:  15  mins  Recommendation Recipient: Patient/Caregiver and Provider  Outcome: Accepted

## 2024-09-23 ENCOUNTER — OFFICE VISIT (OUTPATIENT)
Dept: FAMILY MEDICINE CLINIC | Facility: CLINIC | Age: 44
End: 2024-09-23
Payer: COMMERCIAL

## 2024-09-23 VITALS
WEIGHT: 247.6 LBS | HEART RATE: 95 BPM | HEIGHT: 71 IN | DIASTOLIC BLOOD PRESSURE: 76 MMHG | OXYGEN SATURATION: 94 % | SYSTOLIC BLOOD PRESSURE: 124 MMHG | BODY MASS INDEX: 34.66 KG/M2

## 2024-09-23 DIAGNOSIS — K42.9 UMBILICAL HERNIA WITHOUT OBSTRUCTION AND WITHOUT GANGRENE: ICD-10-CM

## 2024-09-23 DIAGNOSIS — E10.9 TYPE 1 DIABETES MELLITUS WITHOUT COMPLICATION (HCC): Primary | ICD-10-CM

## 2024-09-23 DIAGNOSIS — E78.49 OTHER HYPERLIPIDEMIA: ICD-10-CM

## 2024-09-23 DIAGNOSIS — Z23 NEEDS FLU SHOT: ICD-10-CM

## 2024-09-23 LAB
LEFT EYE DIABETIC RETINOPATHY: POSITIVE
RIGHT EYE DIABETIC RETINOPATHY: POSITIVE

## 2024-09-23 PROCEDURE — 99213 OFFICE O/P EST LOW 20 MIN: CPT | Performed by: INTERNAL MEDICINE

## 2024-09-23 PROCEDURE — 90673 RIV3 VACCINE NO PRESERV IM: CPT | Performed by: INTERNAL MEDICINE

## 2024-09-23 PROCEDURE — 90471 IMMUNIZATION ADMIN: CPT | Performed by: INTERNAL MEDICINE

## 2024-09-23 PROCEDURE — 99396 PREV VISIT EST AGE 40-64: CPT | Performed by: INTERNAL MEDICINE

## 2024-09-23 NOTE — PROGRESS NOTES
Ambulatory Visit  Name: Crow Corea      : 1980      MRN: [id number]  Encounter Provider: Levar Watson MD  Encounter Date: 2024   Encounter department: [facility] PRIMARY CARE    Assessment & Plan  1. Type 1 Diabetes Mellitus.  His A1c level is commendable at 5.2%, consistent with previous readings. Blood glucose levels typically range between  mg/dL, with occasional anomalies. He experiences false elevated readings during arm workouts due to low subcutaneous fat, which affects the Dexcom G7 monitor. He has learned to administer insulin 15-20 minutes before consuming carbohydrates to manage blood sugar levels effectively. He will continue with the current monitor and pump settings. A referral to an endocrinologist at [facility] was discussed but not pursued. An influenza vaccine will be administered today.    2. Umbilical Hernia.  The hernia is more prominent due to increased leanness. Surgery is scheduled for  at [place], to be performed laparoscopically with a robot.    3. Health Maintenance.  He had a full diabetic eye exam on May 13, 2024, and is overdue for a follow-up with the specialist. He visits the dentist twice a year.           History of Present Illness     History of Present Illness  The patient is a 43-year-old male who presents for a physical exam.    He has been using the Dexcom G7 glucose monitor but reports that it provides false elevated readings, similar to his experience with the G6 model. He recalls an incident where he administered 28 units of insulin subcutaneously at night, which had no effect on his blood sugar levels. He adjusts his pump settings weekly. His weight fluctuates, and he has noticed changes in his muscle mass and fat loss. He also mentions that his insulin resistance varies constantly. He has learned to administer insulin 15 to 20 minutes before consuming carbohydrates. He maintains a strict diet and believes that mental  "stress exacerbates his diabetes.    He visits his eye doctor biannually and his dentist twice a year. He underwent a sleep test, which ruled out sleep apnea. He does not experience shortness of breath during workouts.    He is scheduled for umbilical hernia surgery on 11/11/2024.    Past Medical History:   Diagnosis Date    Abdominal hematoma 7/6/2021    Diabetes mellitus (HCC) 10/10/2005    without mention of complication, not stated as uncontrolled    Hypercholesterolemia 10/10/2005    Hypokalemia 07/05/2006     Past Surgical History:   Procedure Laterality Date    [facility]EN[facility]CT[facility]    CARPAL TUNNEL RELEASE      VARICOCELE EXCISION Left 1995     Family History   Problem Relation Age of Onset    Seizures Sister     Thyroid disease Mother      Social History     Tobacco Use    Smoking status: Never     Passive exposure: Never    Smokeless tobacco: Never   Vaping Use    Vaping status: Never Used   Substance and Sexual Activity    Alcohol use: Not Currently     Comment: occasionally    Drug use: No    Sexual activity: Yes     Partners: Female     Birth control/protection: I.U.D.     Current Outpatient Medications on File Prior to Visit   Medication Sig    Continuous Glucose Sensor (Dexcom G7 Sensor) Use 1 Device every 10 days    glucagon (GLUCAGEN) 1 mg injection Inject 1 mg into a muscle as needed      glucose blood (Contour Next Test) test strip Check blood sugars 10 times    HumaLOG 100 UNIT/ML injection INJECT UNDER THE SKIN, 12 A.M.-5 A.M. 1.20 U/HR, 0500-8 A.M. 1.60 U/HR, 8-12 P.M. 1.45 U/HR, 12-5 P.M. 1.50 U/HR, 5-12 A.M. 1.65 U/HR    Insulin Syringe 27G X 1/2\" 0.5 ML MISC by Does not apply route 3 (three) times a day as needed (use if pump fails)    Multiple Vitamins-Minerals (MULTIVITAMIN ADULTS PO) Take 1 tablet by mouth daily       No Known Allergies  Immunization History   Administered Date(s) Administered    COVID-19 PFIZER VACCINE 0.3 ML IM 03/17/2021, 04/14/2021, 12/12/2021    " "INFLUENZA 01/29/2015, 11/06/2015, 10/13/2016, 12/07/2017, 10/11/2018, 12/09/2019, 09/30/2020, 11/11/2021, 10/18/2022    Influenza Quadrivalent 3 years and older 01/29/2015, 11/06/2015, 10/13/2016    Influenza Split 10/20/2008, 10/07/2010, 10/29/2012    Influenza, injectable, quadrivalent, preservative free 0.5 mL 12/07/2017, 10/11/2018, 12/09/2019, 09/30/2020, 10/18/2022    Pneumococcal Polysaccharide PPV23 04/30/1999    Tdap 07/05/2006     Objective     /76 (BP Location: Left arm, Patient Position: Sitting, Cuff Size: Large)   Pulse 95   Ht 5' 11\" (1.803 m)   Wt 112 kg (247 lb 9.6 oz)   SpO2 94%   BMI 34.53 kg/m²     Physical Exam    Physical Exam  Vitals reviewed.   Constitutional:       General: He is not in acute distress.     Appearance: Normal appearance. He is well-developed. He is not ill-appearing.   HENT:      Head: Normocephalic and atraumatic.      Right Ear: Tympanic membrane and external ear normal.      Left Ear: Tympanic membrane and external ear normal.      Nose: Nose normal.      Mouth/Throat:      Mouth: Mucous membranes are moist.      Pharynx: Oropharynx is clear.   Eyes:      General: No scleral icterus.  Neck:      Thyroid: No thyromegaly.      Vascular: No JVD.   Cardiovascular:      Rate and Rhythm: Normal rate and regular rhythm.      Heart sounds: Normal heart sounds. No murmur heard.     No friction rub. No gallop.   Pulmonary:      Breath sounds: Normal breath sounds.   Abdominal:      General: Bowel sounds are normal. There is no distension.      Palpations: Abdomen is soft. There is no mass.      Hernia: A hernia (reducible umbilical hernia) is present.   Musculoskeletal:         General: No swelling.      Comments: Markedly muscular build.   Skin:     Comments: Tattoos   Neurological:      Mental Status: He is alert.   Psychiatric:         Mood and Affect: Mood normal.           "

## 2024-11-05 ENCOUNTER — TELEPHONE (OUTPATIENT)
Dept: FAMILY MEDICINE CLINIC | Facility: CLINIC | Age: 44
End: 2024-11-05

## 2024-11-05 DIAGNOSIS — E10.9 TYPE 1 DIABETES MELLITUS WITHOUT COMPLICATION (HCC): Primary | ICD-10-CM

## 2024-11-05 RX ORDER — PROCHLORPERAZINE 25 MG/1
SUPPOSITORY RECTAL
Qty: 9 EACH | Refills: 3 | Status: SHIPPED | OUTPATIENT
Start: 2024-11-05

## 2024-11-05 NOTE — TELEPHONE ENCOUNTER
Pt called and stated that he was getting bad reading s after a few days on the G7 sensors and can not get a refill from the pharmacy due to going through the g7 sensors so fast. Pt would like to try the g6 sensors again to see if he can get them filled. Pt stated he is having a surgery next week and doesn't want to be with out the sensors. Please call the pt with any questions or concerns.      Reason for call:   [x] Refill   [] Prior Auth  [] Other:     Office:   [x] PCP/Provider -   [] Specialty/Provider -     Medication:   Continuous Glucose Transmitter (Dexcom G6 Transmitter)   USE FOR CONTINUOUS BLOOD GLUCOSE MONITORING AND CHANGE EVERY 90 DAYS       [id number] - [place], [place] - [address]      Does the patient have enough for 3 days?   [] Yes   [x] No - Send as HP to POD

## 2025-02-03 ENCOUNTER — TELEPHONE (OUTPATIENT)
Dept: FAMILY MEDICINE CLINIC | Facility: CLINIC | Age: 45
End: 2025-02-03

## 2025-02-03 ENCOUNTER — PATIENT MESSAGE (OUTPATIENT)
Dept: FAMILY MEDICINE CLINIC | Facility: CLINIC | Age: 45
End: 2025-02-03

## 2025-02-03 DIAGNOSIS — E10.9 TYPE 1 DIABETES MELLITUS WITHOUT COMPLICATION (HCC): ICD-10-CM

## 2025-02-10 RX ORDER — ACYCLOVIR 400 MG/1
1 TABLET ORAL
Qty: 12 EACH | Refills: 3 | Status: SHIPPED | OUTPATIENT
Start: 2025-02-10 | End: 2025-02-14 | Stop reason: SDUPTHER

## 2025-02-14 ENCOUNTER — TELEPHONE (OUTPATIENT)
Age: 45
End: 2025-02-14

## 2025-02-14 DIAGNOSIS — E10.9 TYPE 1 DIABETES MELLITUS WITHOUT COMPLICATION (HCC): ICD-10-CM

## 2025-02-14 RX ORDER — ACYCLOVIR 400 MG/1
1 TABLET ORAL
Qty: 12 EACH | Refills: 3 | Status: SHIPPED | OUTPATIENT
Start: 2025-02-14

## 2025-02-14 NOTE — TELEPHONE ENCOUNTER
[facility] pharmacy called in and stated that if  what's the patient to change the Dexcom sensor every 7 days this will need a prior  auth. If the sensor is changed  every 10 days as it should be then no prior auth is needed.    Please fax new script over to [facility]'s pharmacy [phone].    Thank you

## 2025-02-18 DIAGNOSIS — E10.9 TYPE 1 DIABETES MELLITUS WITHOUT COMPLICATION (HCC): ICD-10-CM

## 2025-02-19 RX ORDER — INSULIN LISPRO 100 [IU]/ML
INJECTION, SOLUTION INTRAVENOUS; SUBCUTANEOUS
Qty: 70 ML | Refills: 1 | Status: SHIPPED | OUTPATIENT
Start: 2025-02-19

## 2025-02-20 ENCOUNTER — TELEPHONE (OUTPATIENT)
Dept: FAMILY MEDICINE CLINIC | Facility: CLINIC | Age: 45
End: 2025-02-20

## 2025-02-20 NOTE — TELEPHONE ENCOUNTER
[facility] states that they did not receive the order for medicals. Form was scanned to patient's chart on 02/10/25    Call back number [phone]

## 2025-02-26 DIAGNOSIS — E10.9 TYPE 1 DIABETES MELLITUS WITHOUT COMPLICATION (HCC): ICD-10-CM

## 2025-02-26 RX ORDER — ACYCLOVIR 400 MG/1
1 TABLET ORAL
Qty: 12 EACH | Refills: 3 | Status: SHIPPED | OUTPATIENT
Start: 2025-02-26

## 2025-03-25 ENCOUNTER — OFFICE VISIT (OUTPATIENT)
Dept: FAMILY MEDICINE CLINIC | Facility: CLINIC | Age: 45
End: 2025-03-25
Payer: COMMERCIAL

## 2025-03-25 VITALS
DIASTOLIC BLOOD PRESSURE: 64 MMHG | SYSTOLIC BLOOD PRESSURE: 118 MMHG | HEIGHT: 71 IN | HEART RATE: 90 BPM | WEIGHT: 260.25 LBS | OXYGEN SATURATION: 97 % | BODY MASS INDEX: 36.44 KG/M2 | TEMPERATURE: 98.2 F

## 2025-03-25 DIAGNOSIS — E10.9 TYPE 1 DIABETES MELLITUS WITHOUT COMPLICATION (HCC): Primary | ICD-10-CM

## 2025-03-25 PROCEDURE — 99213 OFFICE O/P EST LOW 20 MIN: CPT | Performed by: INTERNAL MEDICINE

## 2025-03-25 NOTE — PROGRESS NOTES
Name: Crow Corea      : 1980      MRN: [id number]  Encounter Provider: Levar Watson MD  Encounter Date: 3/25/2025   Encounter department: [facility] PRIMARY CARE    Assessment & Plan  Type 1 diabetes mellitus without complication (HCC)    Lab Results   Component Value Date    HGBA1C 5.4 03/15/2025     His A1c remains well-controlled at 5.4%. He continues to use his insulin pump and glucose monitor effectively, despite some issues with the Dexcom device. He is advised to continue his current regimen and monitor for any unexplained fluctuations in blood glucose levels. A lipid panel will be ordered during his next visit. A urine microalbumin test is scheduled for 2025.  Orders:    Albumin / creatinine urine ratio; Future    Basic metabolic panel; Future    Lipid panel; Future    Hemoglobin A1C; Future    Postoperative status following hernia repair.  He reports some discomfort and fullness in the area of the hernia repair, especially after intense workouts. This could be due to the mesh used in the surgery. He is advised to monitor the symptoms and avoid strenuous activities that may exacerbate the discomfort. If symptoms persist or worsen, further evaluation may be necessary.    Follow-up  The patient will follow up in 3 months.       History of Present Illness     History of Present Illness  The patient is a 44-year-old male who presents for evaluation of diabetes and status post hernia repair.    He reports a stable condition with his diabetes, managed through the use of a pump and glucose monitor. He has been experiencing issues with his Dexcom device, which he is required to wear for a duration of 10 days at the same site. This has resulted in skin irritation due to constant pressure during sleep. He is unable to afford additional devices out-of-pocket, so his mother has been providing him with samples from her workplace. He has recently initiated a weight loss regimen and is  "monitoring the reliability of the device as his body composition changes. The only recommended site for the device is the back of his arm. He is currently well-stocked with insulin and other necessary supplies.    He underwent hernia repair surgery in 01/2025, which was more painful than anticipated, necessitating an overnight hospital stay. During the procedure, a third hernia was discovered and subsequently repaired. He experienced significant discomfort post-surgery, leading him to delay his return to the gym by an additional week. He has read that the mesh used in the repair can cause discomfort. He notes an increase in abdominal weight, which he suspects may be exerting pressure on the surgical site. Despite these issues, he reports no visible bulging. However, he does experience discomfort following intense physical activity.    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             Objective   /64 (BP Location: Right arm, Patient Position: Sitting, Cuff Size: Large)   Pulse 90   Temp 98.2 °F (36.8 °C) (Temporal)   Ht 5' 11\" (1.803 m)   Wt 118 kg (260 lb 4 oz)   SpO2 97%   BMI 36.30 kg/m²     Physical Exam    Physical Exam  Constitutional:       General: He is not in acute distress.     Appearance: Normal appearance. He is well-developed. He is not ill-appearing.   Neck:      Vascular: No JVD.   Cardiovascular:      Rate and Rhythm: Normal rate and regular rhythm.      Heart sounds: Normal heart sounds. No murmur heard.     No friction rub. No gallop.   Pulmonary:      Breath sounds: Normal breath sounds.   Abdominal:      General: Bowel sounds are normal. There is no distension.      Palpations: Abdomen is soft. There is no mass.      Comments: Well-healed midline laparotomy scar.  There is a small persistent umbilical hernia.  It was nontender.   Musculoskeletal:         General: No swelling.   Neurological:      Mental Status: He is alert.         "

## 2025-03-25 NOTE — ASSESSMENT & PLAN NOTE
Lab Results   Component Value Date    HGBA1C 5.4 03/15/2025     His A1c remains well-controlled at 5.4%. He continues to use his insulin pump and glucose monitor effectively, despite some issues with the Dexcom device. He is advised to continue his current regimen and monitor for any unexplained fluctuations in blood glucose levels. A lipid panel will be ordered during his next visit. A urine microalbumin test is scheduled for April 2025.  Orders:    Albumin / creatinine urine ratio; Future    Basic metabolic panel; Future    Lipid panel; Future    Hemoglobin A1C; Future

## 2025-04-25 ENCOUNTER — TELEPHONE (OUTPATIENT)
Dept: FAMILY MEDICINE CLINIC | Facility: CLINIC | Age: 45
End: 2025-04-25

## 2025-04-25 NOTE — TELEPHONE ENCOUNTER
Spoke w pt to change PCP, he would like to go  somewhere closer to his house.     Plz remove Dr. Watson as PCP.

## 2025-04-28 NOTE — TELEPHONE ENCOUNTER
04/28/25 7:16 AM        The office's request has been received, reviewed, and the patient chart updated. The PCP has successfully been removed with a patient attribution note. This message will now be completed.        Thank you  Killian Pearl